# Patient Record
Sex: FEMALE | Race: WHITE | NOT HISPANIC OR LATINO
[De-identification: names, ages, dates, MRNs, and addresses within clinical notes are randomized per-mention and may not be internally consistent; named-entity substitution may affect disease eponyms.]

---

## 2017-08-10 ENCOUNTER — RESULT REVIEW (OUTPATIENT)
Age: 32
End: 2017-08-10

## 2018-07-25 ENCOUNTER — RX RENEWAL (OUTPATIENT)
Age: 33
End: 2018-07-25

## 2018-08-16 ENCOUNTER — APPOINTMENT (OUTPATIENT)
Dept: PULMONOLOGY | Facility: CLINIC | Age: 33
End: 2018-08-16
Payer: COMMERCIAL

## 2018-08-16 VITALS — OXYGEN SATURATION: 98 % | HEART RATE: 77 BPM | DIASTOLIC BLOOD PRESSURE: 70 MMHG | SYSTOLIC BLOOD PRESSURE: 103 MMHG

## 2018-08-16 DIAGNOSIS — Z78.9 OTHER SPECIFIED HEALTH STATUS: ICD-10-CM

## 2018-08-16 PROCEDURE — 88738 HGB QUANT TRANSCUTANEOUS: CPT

## 2018-08-16 PROCEDURE — 99214 OFFICE O/P EST MOD 30 MIN: CPT | Mod: 25

## 2018-08-16 PROCEDURE — 94726 PLETHYSMOGRAPHY LUNG VOLUMES: CPT

## 2018-08-16 PROCEDURE — 94060 EVALUATION OF WHEEZING: CPT

## 2018-08-16 PROCEDURE — 95012 NITRIC OXIDE EXP GAS DETER: CPT

## 2018-08-16 PROCEDURE — 94729 DIFFUSING CAPACITY: CPT

## 2018-09-06 ENCOUNTER — RECORD ABSTRACTING (OUTPATIENT)
Age: 33
End: 2018-09-06

## 2019-02-28 ENCOUNTER — APPOINTMENT (OUTPATIENT)
Dept: PULMONOLOGY | Facility: CLINIC | Age: 34
End: 2019-02-28

## 2019-03-11 ENCOUNTER — RESULT REVIEW (OUTPATIENT)
Age: 34
End: 2019-03-11

## 2019-08-02 ENCOUNTER — ASOB RESULT (OUTPATIENT)
Age: 34
End: 2019-08-02

## 2019-08-02 ENCOUNTER — APPOINTMENT (OUTPATIENT)
Dept: ANTEPARTUM | Facility: CLINIC | Age: 34
End: 2019-08-02
Payer: COMMERCIAL

## 2019-08-02 PROCEDURE — 76817 TRANSVAGINAL US OBSTETRIC: CPT

## 2019-08-02 PROCEDURE — 76811 OB US DETAILED SNGL FETUS: CPT

## 2019-08-20 ENCOUNTER — RX RENEWAL (OUTPATIENT)
Age: 34
End: 2019-08-20

## 2019-10-31 ENCOUNTER — APPOINTMENT (OUTPATIENT)
Dept: PULMONOLOGY | Facility: CLINIC | Age: 34
End: 2019-10-31

## 2019-11-13 ENCOUNTER — RX RENEWAL (OUTPATIENT)
Age: 34
End: 2019-11-13

## 2019-11-27 ENCOUNTER — TRANSCRIPTION ENCOUNTER (OUTPATIENT)
Age: 34
End: 2019-11-27

## 2019-11-27 ENCOUNTER — APPOINTMENT (OUTPATIENT)
Dept: ULTRASOUND IMAGING | Facility: CLINIC | Age: 34
End: 2019-11-27
Payer: COMMERCIAL

## 2019-11-27 ENCOUNTER — OUTPATIENT (OUTPATIENT)
Dept: OUTPATIENT SERVICES | Facility: HOSPITAL | Age: 34
LOS: 1 days | End: 2019-11-27
Payer: COMMERCIAL

## 2019-11-27 DIAGNOSIS — Z00.8 ENCOUNTER FOR OTHER GENERAL EXAMINATION: ICD-10-CM

## 2019-11-27 PROCEDURE — 76705 ECHO EXAM OF ABDOMEN: CPT

## 2019-11-27 PROCEDURE — 76705 ECHO EXAM OF ABDOMEN: CPT | Mod: 26

## 2019-11-29 ENCOUNTER — OUTPATIENT (OUTPATIENT)
Dept: OUTPATIENT SERVICES | Facility: HOSPITAL | Age: 34
LOS: 1 days | End: 2019-11-29
Payer: COMMERCIAL

## 2019-11-29 DIAGNOSIS — Z01.818 ENCOUNTER FOR OTHER PREPROCEDURAL EXAMINATION: ICD-10-CM

## 2019-11-29 LAB
ANION GAP SERPL CALC-SCNC: 14 MMOL/L — SIGNIFICANT CHANGE UP (ref 5–17)
BLD GP AB SCN SERPL QL: NEGATIVE — SIGNIFICANT CHANGE UP
BUN SERPL-MCNC: 8 MG/DL — SIGNIFICANT CHANGE UP (ref 7–23)
CALCIUM SERPL-MCNC: 9.2 MG/DL — SIGNIFICANT CHANGE UP (ref 8.4–10.5)
CHLORIDE SERPL-SCNC: 103 MMOL/L — SIGNIFICANT CHANGE UP (ref 96–108)
CO2 SERPL-SCNC: 19 MMOL/L — LOW (ref 22–31)
CREAT SERPL-MCNC: 0.7 MG/DL — SIGNIFICANT CHANGE UP (ref 0.5–1.3)
GLUCOSE SERPL-MCNC: 80 MG/DL — SIGNIFICANT CHANGE UP (ref 70–99)
HCT VFR BLD CALC: 36.5 % — SIGNIFICANT CHANGE UP (ref 34.5–45)
HGB BLD-MCNC: 11.8 G/DL — SIGNIFICANT CHANGE UP (ref 11.5–15.5)
MCHC RBC-ENTMCNC: 28.2 PG — SIGNIFICANT CHANGE UP (ref 27–34)
MCHC RBC-ENTMCNC: 32.3 GM/DL — SIGNIFICANT CHANGE UP (ref 32–36)
MCV RBC AUTO: 87.1 FL — SIGNIFICANT CHANGE UP (ref 80–100)
PLATELET # BLD AUTO: 208 K/UL — SIGNIFICANT CHANGE UP (ref 150–400)
POTASSIUM SERPL-MCNC: 3.9 MMOL/L — SIGNIFICANT CHANGE UP (ref 3.5–5.3)
POTASSIUM SERPL-SCNC: 3.9 MMOL/L — SIGNIFICANT CHANGE UP (ref 3.5–5.3)
RBC # BLD: 4.19 M/UL — SIGNIFICANT CHANGE UP (ref 3.8–5.2)
RBC # FLD: 14.8 % — HIGH (ref 10.3–14.5)
RH IG SCN BLD-IMP: POSITIVE — SIGNIFICANT CHANGE UP
SODIUM SERPL-SCNC: 136 MMOL/L — SIGNIFICANT CHANGE UP (ref 135–145)
WBC # BLD: 10.63 K/UL — HIGH (ref 3.8–10.5)
WBC # FLD AUTO: 10.63 K/UL — HIGH (ref 3.8–10.5)

## 2019-11-29 PROCEDURE — 86900 BLOOD TYPING SEROLOGIC ABO: CPT

## 2019-11-29 PROCEDURE — G0463: CPT

## 2019-11-29 PROCEDURE — 85027 COMPLETE CBC AUTOMATED: CPT

## 2019-11-29 PROCEDURE — 86901 BLOOD TYPING SEROLOGIC RH(D): CPT

## 2019-11-29 PROCEDURE — 86850 RBC ANTIBODY SCREEN: CPT

## 2019-11-29 PROCEDURE — 80048 BASIC METABOLIC PNL TOTAL CA: CPT

## 2019-11-29 RX ORDER — OXYTOCIN 10 UNIT/ML
333.33 VIAL (ML) INJECTION
Qty: 20 | Refills: 0 | Status: DISCONTINUED | OUTPATIENT
Start: 2019-12-12 | End: 2019-12-13

## 2019-11-29 RX ORDER — METOCLOPRAMIDE HCL 10 MG
10 TABLET ORAL ONCE
Refills: 0 | Status: DISCONTINUED | OUTPATIENT
Start: 2019-12-12 | End: 2019-12-12

## 2019-11-29 RX ORDER — SODIUM CHLORIDE 9 MG/ML
1000 INJECTION, SOLUTION INTRAVENOUS
Refills: 0 | Status: DISCONTINUED | OUTPATIENT
Start: 2019-12-12 | End: 2019-12-12

## 2019-12-11 ENCOUNTER — TRANSCRIPTION ENCOUNTER (OUTPATIENT)
Age: 34
End: 2019-12-11

## 2019-12-12 ENCOUNTER — INPATIENT (INPATIENT)
Facility: HOSPITAL | Age: 34
LOS: 2 days | Discharge: HOME | End: 2019-12-15
Attending: OBSTETRICS & GYNECOLOGY | Admitting: OBSTETRICS & GYNECOLOGY
Payer: COMMERCIAL

## 2019-12-12 VITALS — WEIGHT: 189.6 LBS

## 2019-12-12 LAB
BLD GP AB SCN SERPL QL: NEGATIVE — SIGNIFICANT CHANGE UP
RH IG SCN BLD-IMP: POSITIVE — SIGNIFICANT CHANGE UP
T PALLIDUM AB TITR SER: NEGATIVE — SIGNIFICANT CHANGE UP

## 2019-12-12 RX ORDER — LANOLIN
1 OINTMENT (GRAM) TOPICAL EVERY 6 HOURS
Refills: 0 | Status: DISCONTINUED | OUTPATIENT
Start: 2019-12-12 | End: 2019-12-15

## 2019-12-12 RX ORDER — GLYCERIN ADULT
1 SUPPOSITORY, RECTAL RECTAL AT BEDTIME
Refills: 0 | Status: DISCONTINUED | OUTPATIENT
Start: 2019-12-12 | End: 2019-12-15

## 2019-12-12 RX ORDER — HEPARIN SODIUM 5000 [USP'U]/ML
5000 INJECTION INTRAVENOUS; SUBCUTANEOUS EVERY 12 HOURS
Refills: 0 | Status: DISCONTINUED | OUTPATIENT
Start: 2019-12-12 | End: 2019-12-15

## 2019-12-12 RX ORDER — ALBUTEROL 90 UG/1
2 AEROSOL, METERED ORAL EVERY 6 HOURS
Refills: 0 | Status: DISCONTINUED | OUTPATIENT
Start: 2019-12-12 | End: 2019-12-15

## 2019-12-12 RX ORDER — ACETAMINOPHEN 500 MG
975 TABLET ORAL
Refills: 0 | Status: DISCONTINUED | OUTPATIENT
Start: 2019-12-12 | End: 2019-12-15

## 2019-12-12 RX ORDER — TETANUS TOXOID, REDUCED DIPHTHERIA TOXOID AND ACELLULAR PERTUSSIS VACCINE, ADSORBED 5; 2.5; 8; 8; 2.5 [IU]/.5ML; [IU]/.5ML; UG/.5ML; UG/.5ML; UG/.5ML
0.5 SUSPENSION INTRAMUSCULAR ONCE
Refills: 0 | Status: DISCONTINUED | OUTPATIENT
Start: 2019-12-12 | End: 2019-12-15

## 2019-12-12 RX ORDER — MAGNESIUM HYDROXIDE 400 MG/1
30 TABLET, CHEWABLE ORAL
Refills: 0 | Status: DISCONTINUED | OUTPATIENT
Start: 2019-12-12 | End: 2019-12-15

## 2019-12-12 RX ORDER — SODIUM CHLORIDE 9 MG/ML
1000 INJECTION, SOLUTION INTRAVENOUS
Refills: 0 | Status: DISCONTINUED | OUTPATIENT
Start: 2019-12-12 | End: 2019-12-15

## 2019-12-12 RX ORDER — NALOXONE HYDROCHLORIDE 4 MG/.1ML
0.1 SPRAY NASAL
Refills: 0 | Status: DISCONTINUED | OUTPATIENT
Start: 2019-12-12 | End: 2019-12-13

## 2019-12-12 RX ORDER — OXYCODONE HYDROCHLORIDE 5 MG/1
5 TABLET ORAL
Refills: 0 | Status: COMPLETED | OUTPATIENT
Start: 2019-12-12 | End: 2019-12-19

## 2019-12-12 RX ORDER — SODIUM CHLORIDE 9 MG/ML
1000 INJECTION, SOLUTION INTRAVENOUS ONCE
Refills: 0 | Status: COMPLETED | OUTPATIENT
Start: 2019-12-12 | End: 2019-12-12

## 2019-12-12 RX ORDER — IBUPROFEN 200 MG
600 TABLET ORAL EVERY 6 HOURS
Refills: 0 | Status: COMPLETED | OUTPATIENT
Start: 2019-12-12 | End: 2020-11-09

## 2019-12-12 RX ORDER — CHLORHEXIDINE GLUCONATE 213 G/1000ML
1 SOLUTION TOPICAL ONCE
Refills: 0 | Status: COMPLETED | OUTPATIENT
Start: 2019-12-12 | End: 2019-12-12

## 2019-12-12 RX ORDER — CEFAZOLIN SODIUM 1 G
2000 VIAL (EA) INJECTION ONCE
Refills: 0 | Status: COMPLETED | OUTPATIENT
Start: 2019-12-12 | End: 2019-12-12

## 2019-12-12 RX ORDER — FENTANYL/BUPIVACAINE/NS/PF 2MCG/ML-.1
5 PLASTIC BAG, INJECTION (ML) INJECTION
Refills: 0 | Status: DISCONTINUED | OUTPATIENT
Start: 2019-12-12 | End: 2019-12-13

## 2019-12-12 RX ORDER — CITRIC ACID/SODIUM CITRATE 300-500 MG
15 SOLUTION, ORAL ORAL ONCE
Refills: 0 | Status: COMPLETED | OUTPATIENT
Start: 2019-12-12 | End: 2019-12-12

## 2019-12-12 RX ORDER — ONDANSETRON 8 MG/1
4 TABLET, FILM COATED ORAL EVERY 6 HOURS
Refills: 0 | Status: DISCONTINUED | OUTPATIENT
Start: 2019-12-12 | End: 2019-12-13

## 2019-12-12 RX ORDER — OXYCODONE HYDROCHLORIDE 5 MG/1
5 TABLET ORAL ONCE
Refills: 0 | Status: DISCONTINUED | OUTPATIENT
Start: 2019-12-12 | End: 2019-12-15

## 2019-12-12 RX ORDER — DEXAMETHASONE 0.5 MG/5ML
4 ELIXIR ORAL EVERY 6 HOURS
Refills: 0 | Status: DISCONTINUED | OUTPATIENT
Start: 2019-12-12 | End: 2019-12-13

## 2019-12-12 RX ORDER — DIPHENHYDRAMINE HCL 50 MG
25 CAPSULE ORAL EVERY 6 HOURS
Refills: 0 | Status: DISCONTINUED | OUTPATIENT
Start: 2019-12-12 | End: 2019-12-15

## 2019-12-12 RX ORDER — OXYTOCIN 10 UNIT/ML
333.33 VIAL (ML) INJECTION
Qty: 20 | Refills: 0 | Status: DISCONTINUED | OUTPATIENT
Start: 2019-12-12 | End: 2019-12-15

## 2019-12-12 RX ORDER — BUDESONIDE AND FORMOTEROL FUMARATE DIHYDRATE 160; 4.5 UG/1; UG/1
2 AEROSOL RESPIRATORY (INHALATION) DAILY
Refills: 0 | Status: DISCONTINUED | OUTPATIENT
Start: 2019-12-12 | End: 2019-12-12

## 2019-12-12 RX ORDER — SIMETHICONE 80 MG/1
80 TABLET, CHEWABLE ORAL EVERY 4 HOURS
Refills: 0 | Status: DISCONTINUED | OUTPATIENT
Start: 2019-12-12 | End: 2019-12-15

## 2019-12-12 RX ORDER — FENTANYL/BUPIVACAINE/NS/PF 2MCG/ML-.1
250 PLASTIC BAG, INJECTION (ML) INJECTION
Refills: 0 | Status: DISCONTINUED | OUTPATIENT
Start: 2019-12-12 | End: 2019-12-13

## 2019-12-12 RX ORDER — FAMOTIDINE 10 MG/ML
20 INJECTION INTRAVENOUS ONCE
Refills: 0 | Status: COMPLETED | OUTPATIENT
Start: 2019-12-12 | End: 2019-12-12

## 2019-12-12 RX ADMIN — FAMOTIDINE 20 MILLIGRAM(S): 10 INJECTION INTRAVENOUS at 06:52

## 2019-12-12 RX ADMIN — HEPARIN SODIUM 5000 UNIT(S): 5000 INJECTION INTRAVENOUS; SUBCUTANEOUS at 18:19

## 2019-12-12 RX ADMIN — Medication 100 MILLIGRAM(S): at 08:00

## 2019-12-12 RX ADMIN — Medication 250 MILLILITER(S): at 13:24

## 2019-12-12 RX ADMIN — Medication 250 MILLILITER(S): at 10:19

## 2019-12-12 RX ADMIN — Medication 975 MILLIGRAM(S): at 19:00

## 2019-12-12 RX ADMIN — Medication 975 MILLIGRAM(S): at 18:19

## 2019-12-12 RX ADMIN — SODIUM CHLORIDE 125 MILLILITER(S): 9 INJECTION, SOLUTION INTRAVENOUS at 09:28

## 2019-12-12 RX ADMIN — Medication 1000 MILLIUNIT(S)/MIN: at 09:27

## 2019-12-12 RX ADMIN — Medication 15 MILLILITER(S): at 06:51

## 2019-12-12 RX ADMIN — CHLORHEXIDINE GLUCONATE 1 APPLICATION(S): 213 SOLUTION TOPICAL at 06:51

## 2019-12-12 RX ADMIN — SODIUM CHLORIDE 2000 MILLILITER(S): 9 INJECTION, SOLUTION INTRAVENOUS at 06:51

## 2019-12-12 NOTE — PRE-ANESTHESIA EVALUATION ADULT - NSANTHPMHFT_GEN_ALL_CORE
37WGA with no issues, previous  due to macrosomia. PMHx significant for Asthma on Advair and PRN Albuterol (No hospitalizations or intubations).  37WGA, previous  due to macrosomia. PMHx significant for Asthma on Advair and PRN Albuterol (No hospitalizations or intubations).

## 2019-12-13 LAB
BASOPHILS # BLD AUTO: 0.05 K/UL — SIGNIFICANT CHANGE UP (ref 0–0.2)
BASOPHILS NFR BLD AUTO: 0.4 % — SIGNIFICANT CHANGE UP (ref 0–2)
EOSINOPHIL # BLD AUTO: 0.15 K/UL — SIGNIFICANT CHANGE UP (ref 0–0.5)
EOSINOPHIL NFR BLD AUTO: 1.2 % — SIGNIFICANT CHANGE UP (ref 0–6)
HCT VFR BLD CALC: 33.4 % — LOW (ref 34.5–45)
HGB BLD-MCNC: 10.6 G/DL — LOW (ref 11.5–15.5)
IMM GRANULOCYTES NFR BLD AUTO: 1.1 % — SIGNIFICANT CHANGE UP (ref 0–1.5)
LYMPHOCYTES # BLD AUTO: 2.59 K/UL — SIGNIFICANT CHANGE UP (ref 1–3.3)
LYMPHOCYTES # BLD AUTO: 21.2 % — SIGNIFICANT CHANGE UP (ref 13–44)
MCHC RBC-ENTMCNC: 27.4 PG — SIGNIFICANT CHANGE UP (ref 27–34)
MCHC RBC-ENTMCNC: 31.7 GM/DL — LOW (ref 32–36)
MCV RBC AUTO: 86.3 FL — SIGNIFICANT CHANGE UP (ref 80–100)
MONOCYTES # BLD AUTO: 0.92 K/UL — HIGH (ref 0–0.9)
MONOCYTES NFR BLD AUTO: 7.5 % — SIGNIFICANT CHANGE UP (ref 2–14)
NEUTROPHILS # BLD AUTO: 8.34 K/UL — HIGH (ref 1.8–7.4)
NEUTROPHILS NFR BLD AUTO: 68.6 % — SIGNIFICANT CHANGE UP (ref 43–77)
NRBC # BLD: 0 /100 WBCS — SIGNIFICANT CHANGE UP (ref 0–0)
PLATELET # BLD AUTO: 191 K/UL — SIGNIFICANT CHANGE UP (ref 150–400)
RBC # BLD: 3.87 M/UL — SIGNIFICANT CHANGE UP (ref 3.8–5.2)
RBC # FLD: 14.6 % — HIGH (ref 10.3–14.5)
WBC # BLD: 12.19 K/UL — HIGH (ref 3.8–10.5)
WBC # FLD AUTO: 12.19 K/UL — HIGH (ref 3.8–10.5)

## 2019-12-13 RX ORDER — OXYCODONE HYDROCHLORIDE 5 MG/1
5 TABLET ORAL ONCE
Refills: 0 | Status: DISCONTINUED | OUTPATIENT
Start: 2019-12-13 | End: 2019-12-15

## 2019-12-13 RX ORDER — IBUPROFEN 200 MG
600 TABLET ORAL EVERY 6 HOURS
Refills: 0 | Status: DISCONTINUED | OUTPATIENT
Start: 2019-12-13 | End: 2019-12-15

## 2019-12-13 RX ORDER — OXYCODONE HYDROCHLORIDE 5 MG/1
5 TABLET ORAL
Refills: 0 | Status: DISCONTINUED | OUTPATIENT
Start: 2019-12-13 | End: 2019-12-15

## 2019-12-13 RX ADMIN — Medication 1 TABLET(S): at 12:39

## 2019-12-13 RX ADMIN — Medication 975 MILLIGRAM(S): at 12:38

## 2019-12-13 RX ADMIN — Medication 600 MILLIGRAM(S): at 15:18

## 2019-12-13 RX ADMIN — Medication 600 MILLIGRAM(S): at 23:13

## 2019-12-13 RX ADMIN — Medication 600 MILLIGRAM(S): at 23:50

## 2019-12-13 RX ADMIN — Medication 975 MILLIGRAM(S): at 18:23

## 2019-12-13 RX ADMIN — Medication 600 MILLIGRAM(S): at 15:48

## 2019-12-13 RX ADMIN — Medication 975 MILLIGRAM(S): at 18:53

## 2019-12-13 RX ADMIN — OXYCODONE HYDROCHLORIDE 5 MILLIGRAM(S): 5 TABLET ORAL at 18:22

## 2019-12-13 RX ADMIN — OXYCODONE HYDROCHLORIDE 5 MILLIGRAM(S): 5 TABLET ORAL at 13:12

## 2019-12-13 RX ADMIN — HEPARIN SODIUM 5000 UNIT(S): 5000 INJECTION INTRAVENOUS; SUBCUTANEOUS at 18:24

## 2019-12-13 RX ADMIN — Medication 600 MILLIGRAM(S): at 10:01

## 2019-12-13 RX ADMIN — Medication 600 MILLIGRAM(S): at 09:32

## 2019-12-13 RX ADMIN — OXYCODONE HYDROCHLORIDE 5 MILLIGRAM(S): 5 TABLET ORAL at 09:31

## 2019-12-13 RX ADMIN — OXYCODONE HYDROCHLORIDE 5 MILLIGRAM(S): 5 TABLET ORAL at 12:42

## 2019-12-13 RX ADMIN — Medication 975 MILLIGRAM(S): at 06:06

## 2019-12-13 RX ADMIN — Medication 975 MILLIGRAM(S): at 13:08

## 2019-12-13 RX ADMIN — Medication 975 MILLIGRAM(S): at 06:45

## 2019-12-13 RX ADMIN — OXYCODONE HYDROCHLORIDE 5 MILLIGRAM(S): 5 TABLET ORAL at 10:01

## 2019-12-13 RX ADMIN — HEPARIN SODIUM 5000 UNIT(S): 5000 INJECTION INTRAVENOUS; SUBCUTANEOUS at 06:06

## 2019-12-13 RX ADMIN — OXYCODONE HYDROCHLORIDE 5 MILLIGRAM(S): 5 TABLET ORAL at 18:52

## 2019-12-13 NOTE — PROGRESS NOTE ADULT - SUBJECTIVE AND OBJECTIVE BOX
Postpartum Note-  Section POD#1    Allergies    dust (Eye Irritation; Rhinorrhea)  No Known Drug Allergies    Blood Type B Positive     RPR Negative    Rubella: Immune    S: Patient is a  34y G        POD#1 S/P  repeat C/Sec  Patient w/o complaints, pain is controlled.  Pt is OOB, tolerating PO, passing flatus. Lochia WNL    Feeding: Breast    O:  Vital Signs Last 24 Hrs  T(C): 36.4 (13 Dec 2019 06:43), Max: 36.7 (12 Dec 2019 21:35)  T(F): 97.5 (13 Dec 2019 06:43), Max: 98.1 (12 Dec 2019 21:35)  HR: 101 (13 Dec 2019 06:43) (66 - 101)  BP: 109/72 (13 Dec 2019 06:43) (99/64 - 120/84)  BP(mean): 88 (12 Dec 2019 12:00) (79 - 98)  RR: 18 (13 Dec 2019 06:43) (18 - 20)  SpO2: 98% (12 Dec 2019 21:35) (96% - 98%)  I&O's Summary    12 Dec 2019 07:01  -  13 Dec 2019 07:00  --------------------------------------------------------  IN: 2000 mL / OUT: 2650 mL / NET: -650 mL        Gen: NAD  Abdomen: +BS, Soft, nontender, non-distended, fundus firm.  Incision: Clean, dry, and intact.  Negative erythema/edema/ecchymosis   Sub Q/Steri  Lochia WNL  Ext: SCDs in place.  Negative Homans B/L    LABS:                          10.6   12.19 )-----------( 191      ( 13 Dec 2019 06:55 )             33.4

## 2019-12-13 NOTE — PROGRESS NOTE ADULT - SUBJECTIVE AND OBJECTIVE BOX
Day 1 of Anesthesia Pain Management Service    SUBJECTIVE: I'm okay  Pain Scale Score:    [X] Refer to charted pain scores    THERAPY: Epidural Bupivacaine 0.01 % and Fentanyl 3 micrograms/mL     Demand Dose: 3 mL  Lockout: 15 minutes   Continuous Rate:  10 mL    MEDICATIONS  (STANDING):  acetaminophen   Tablet .. 975 milliGRAM(s) Oral <User Schedule>  diphtheria/tetanus/pertussis (acellular) Vaccine (ADAcel) 0.5 milliLiter(s) IntraMuscular once  fentanyl (3 MICROgram(s)/mL) + bupivacaine 0.01% in 0.9% Sodium Chloride PCEA 250 milliLiter(s) Epidural PCA Continuous  heparin  Injectable 5000 Unit(s) SubCutaneous every 12 hours  ibuprofen  Tablet. 600 milliGRAM(s) Oral every 6 hours  lactated ringers. 1000 milliLiter(s) (125 mL/Hr) IV Continuous <Continuous>  oxytocin Infusion 333.333 milliUNIT(s)/Min (1000 mL/Hr) IV Continuous <Continuous>  prenatal multivitamin 1 Tablet(s) Oral daily    MEDICATIONS  (PRN):  ALBUTerol    90 MICROgram(s) HFA Inhaler 2 Puff(s) Inhalation every 6 hours PRN Shortness of Breath and/or Wheezing  dexAMETHasone  Injectable 4 milliGRAM(s) IV Push every 6 hours PRN Nausea  diphenhydrAMINE 25 milliGRAM(s) Oral every 6 hours PRN Itching  fentanyl (3 MICROgram(s)/mL) + bupivacaine 0.01% in 0.9% Sodium Chloride PCEA Rescue Clinician Bolus 5 milliLiter(s) Epidural every 15 minutes PRN Moderate Pain (4 - 6)  glycerin Suppository - Adult 1 Suppository(s) Rectal at bedtime PRN Constipation  lanolin Ointment 1 Application(s) Topical every 6 hours PRN Sore Nipples  magnesium hydroxide Suspension 30 milliLiter(s) Oral two times a day PRN Constipation  naloxone Injectable 0.1 milliGRAM(s) IV Push every 3 minutes PRN For ANY of the following changes in patient status:  A. RR LESS THAN 10 breaths per minute, B. Oxygen saturation LESS THAN 90%, C. Sedation score of 6  ondansetron Injectable 4 milliGRAM(s) IV Push every 6 hours PRN Nausea  oxyCODONE    IR 5 milliGRAM(s) Oral once PRN Moderate to Severe Pain (4-10)  oxyCODONE    IR 5 milliGRAM(s) Oral every 3 hours PRN Moderate to Severe Pain (4-10)  oxyCODONE    IR 5 milliGRAM(s) Oral once PRN Moderate to Severe Pain (4-10)  simethicone 80 milliGRAM(s) Chew every 4 hours PRN Gas      OBJECTIVE:    Assessment of Epidural Catheter Site: 	    [ ] Dressing intact	[X] Site non-tender	[X] Site without erythema, discharge, edema  [ ] Epidural tubing and connection checked	[X] Gross neurological exam within normal limits  [X] Catheter removed                          10.6   12.19 )-----------( 191      ( 13 Dec 2019 06:55 )             33.4     Vital Signs Last 24 Hrs  T(C): 36.8 (12-13-19 @ 08:59), Max: 36.8 (12-13-19 @ 08:59)  T(F): 98.2 (12-13-19 @ 08:59), Max: 98.2 (12-13-19 @ 08:59)  HR: 83 (12-13-19 @ 08:59) (66 - 101)  BP: 104/69 (12-13-19 @ 08:59) (99/64 - 118/72)  BP(mean): 88 (12-12-19 @ 12:00) (81 - 94)  RR: 18 (12-13-19 @ 08:59) (18 - 20)  SpO2: 97% (12-13-19 @ 08:59) (96% - 98%)      Sedation Score:	[X] Alert	[ ] Drowsy	[ ] Arousable  [ ] Asleep  [ ] Unresponsive    Side Effects:	[X] None	[ ] Nausea	[ ] Vomiting  [ ] Pruritus  		[ ] Weakness  [ ] Numbness  [ ] Other:    ASSESSMENT/ PLAN:    Therapy:                         [ ] Continue   [X] Discontinue   [X] Change to PRN Analgesics    Documentation and Verification of current medications:  [X] Done	[ ] Not done, not eligible, reason:    Comments: Anticoagulation status reviewed.

## 2019-12-13 NOTE — PROGRESS NOTE ADULT - ASSESSMENT
A/P:  34y    P    S/P  repeat   section   POD # 1, doing well        PMHx: none  Current Issues: none

## 2019-12-13 NOTE — PROGRESS NOTE ADULT - SUBJECTIVE AND OBJECTIVE BOX
Postpartum Note,  Section   ATTENDING NOTE - Post-operative day 1    Subjective:  The patient feels well. Ambulating without difficulty  Pt is tolerating regular diet. Pain well controlled.  She denies nausea and vomiting.  She reports normal postpartum bleeding    Physical exam:    Vital Signs Last 24 Hrs  T(C): 36.8 (13 Dec 2019 08:59), Max: 36.8 (13 Dec 2019 08:59)  T(F): 98.2 (13 Dec 2019 08:59), Max: 98.2 (13 Dec 2019 08:59)  HR: 83 (13 Dec 2019 08:59) (66 - 101)  BP: 104/69 (13 Dec 2019 08:59) (99/64 - 115/76)  BP(mean): 88 (12 Dec 2019 12:00) (88 - 92)  RR: 18 (13 Dec 2019 08:59) (18 - 19)  SpO2: 97% (13 Dec 2019 08:59) (96% - 98%)    Gen: NAD  Breast: Soft, nontender, not engorged.  Abdomen: Soft, nontender, no distension , firm uterine fundus at umbilicus -2.  Incision: Clean, dry, and intact with steris  Ext: No calf tenderness, no hyper reflexia, (  )trace (  )1+  edema      LABS:                        10.6   12.19 )-----------( 191      ( 13 Dec 2019 06:55 )             33.4                   Allergies    dust (Eye Irritation; Rhinorrhea)  No Known Drug Allergies    Intolerances      MEDICATIONS  (STANDING):  acetaminophen   Tablet .. 975 milliGRAM(s) Oral <User Schedule>  diphtheria/tetanus/pertussis (acellular) Vaccine (ADAcel) 0.5 milliLiter(s) IntraMuscular once  heparin  Injectable 5000 Unit(s) SubCutaneous every 12 hours  ibuprofen  Tablet. 600 milliGRAM(s) Oral every 6 hours  lactated ringers. 1000 milliLiter(s) (125 mL/Hr) IV Continuous <Continuous>  oxytocin Infusion 333.333 milliUNIT(s)/Min (1000 mL/Hr) IV Continuous <Continuous>  prenatal multivitamin 1 Tablet(s) Oral daily    MEDICATIONS  (PRN):  ALBUTerol    90 MICROgram(s) HFA Inhaler 2 Puff(s) Inhalation every 6 hours PRN Shortness of Breath and/or Wheezing  diphenhydrAMINE 25 milliGRAM(s) Oral every 6 hours PRN Itching  glycerin Suppository - Adult 1 Suppository(s) Rectal at bedtime PRN Constipation  lanolin Ointment 1 Application(s) Topical every 6 hours PRN Sore Nipples  magnesium hydroxide Suspension 30 milliLiter(s) Oral two times a day PRN Constipation  oxyCODONE    IR 5 milliGRAM(s) Oral once PRN Moderate to Severe Pain (4-10)  oxyCODONE    IR 5 milliGRAM(s) Oral every 3 hours PRN Moderate to Severe Pain (4-10)  oxyCODONE    IR 5 milliGRAM(s) Oral once PRN Moderate to Severe Pain (4-10)  simethicone 80 milliGRAM(s) Chew every 4 hours PRN Gas        Assessment and Plan    Office [phone]  Dr. Ren

## 2019-12-14 RX ADMIN — Medication 600 MILLIGRAM(S): at 21:14

## 2019-12-14 RX ADMIN — Medication 1 TABLET(S): at 13:01

## 2019-12-14 RX ADMIN — Medication 600 MILLIGRAM(S): at 21:45

## 2019-12-14 RX ADMIN — Medication 975 MILLIGRAM(S): at 17:08

## 2019-12-14 RX ADMIN — HEPARIN SODIUM 5000 UNIT(S): 5000 INJECTION INTRAVENOUS; SUBCUTANEOUS at 17:08

## 2019-12-14 RX ADMIN — Medication 975 MILLIGRAM(S): at 06:08

## 2019-12-14 RX ADMIN — Medication 975 MILLIGRAM(S): at 23:05

## 2019-12-14 RX ADMIN — Medication 975 MILLIGRAM(S): at 22:35

## 2019-12-14 RX ADMIN — Medication 600 MILLIGRAM(S): at 13:30

## 2019-12-14 RX ADMIN — Medication 600 MILLIGRAM(S): at 13:01

## 2019-12-14 RX ADMIN — HEPARIN SODIUM 5000 UNIT(S): 5000 INJECTION INTRAVENOUS; SUBCUTANEOUS at 06:09

## 2019-12-14 RX ADMIN — Medication 975 MILLIGRAM(S): at 17:38

## 2019-12-14 NOTE — PROGRESS NOTE ADULT - SUBJECTIVE AND OBJECTIVE BOX
Postpartum Note-  Section POD#2    Allergies    dust (Eye Irritation; Rhinorrhea)  No Known Drug Allergies    Subjective: Patient w/o complaints, pain is controlled.  Pt is OOB, tolerating PO, passing flatus, and voiding. Lochia WNL.     O:  Vital Signs Last 24 Hrs  T(C): 36.7 (14 Dec 2019 05:06), Max: 36.8 (13 Dec 2019 08:59)  T(F): 98.1 (14 Dec 2019 05:06), Max: 98.2 (13 Dec 2019 08:59)  HR: 70 (14 Dec 2019 05:15) (62 - 83)  BP: 99/61 (14 Dec 2019 05:15) (92/62 - 108/71)  BP(mean): --  RR: 18 (14 Dec 2019 05:06) (18 - 18)  SpO2: 97% (14 Dec 2019 05:06) (96% - 99%)      Gen: NAD  S1S2 RRR  Lungs CTA b/l  Abdomen: Soft, nontender, non distended, fundus firm.  Incision: Clean, dry, and intact.  Negative erythema/edema/ecchymosis.   SubQ  Lochia WNL  Ext: Neg edema, Neg calf tenderness    LABS:               10.6   12.19 )-----------( 191      ( 12-13 @ 06:55 )             33.4

## 2019-12-15 ENCOUNTER — TRANSCRIPTION ENCOUNTER (OUTPATIENT)
Age: 34
End: 2019-12-15

## 2019-12-15 VITALS
SYSTOLIC BLOOD PRESSURE: 104 MMHG | TEMPERATURE: 98 F | DIASTOLIC BLOOD PRESSURE: 73 MMHG | RESPIRATION RATE: 18 BRPM | HEART RATE: 67 BPM

## 2019-12-15 LAB
BASOPHILS # BLD AUTO: 0.06 K/UL — SIGNIFICANT CHANGE UP (ref 0–0.2)
BASOPHILS NFR BLD AUTO: 0.7 % — SIGNIFICANT CHANGE UP (ref 0–2)
EOSINOPHIL # BLD AUTO: 0.22 K/UL — SIGNIFICANT CHANGE UP (ref 0–0.5)
EOSINOPHIL NFR BLD AUTO: 2.6 % — SIGNIFICANT CHANGE UP (ref 0–6)
HCT VFR BLD CALC: 30.6 % — LOW (ref 34.5–45)
HGB BLD-MCNC: 9.5 G/DL — LOW (ref 11.5–15.5)
IMM GRANULOCYTES NFR BLD AUTO: 1.1 % — SIGNIFICANT CHANGE UP (ref 0–1.5)
LYMPHOCYTES # BLD AUTO: 1.93 K/UL — SIGNIFICANT CHANGE UP (ref 1–3.3)
LYMPHOCYTES # BLD AUTO: 22.5 % — SIGNIFICANT CHANGE UP (ref 13–44)
MCHC RBC-ENTMCNC: 27.1 PG — SIGNIFICANT CHANGE UP (ref 27–34)
MCHC RBC-ENTMCNC: 31 GM/DL — LOW (ref 32–36)
MCV RBC AUTO: 87.4 FL — SIGNIFICANT CHANGE UP (ref 80–100)
MONOCYTES # BLD AUTO: 0.67 K/UL — SIGNIFICANT CHANGE UP (ref 0–0.9)
MONOCYTES NFR BLD AUTO: 7.8 % — SIGNIFICANT CHANGE UP (ref 2–14)
NEUTROPHILS # BLD AUTO: 5.59 K/UL — SIGNIFICANT CHANGE UP (ref 1.8–7.4)
NEUTROPHILS NFR BLD AUTO: 65.3 % — SIGNIFICANT CHANGE UP (ref 43–77)
NRBC # BLD: 0 /100 WBCS — SIGNIFICANT CHANGE UP (ref 0–0)
PLATELET # BLD AUTO: 185 K/UL — SIGNIFICANT CHANGE UP (ref 150–400)
RBC # BLD: 3.5 M/UL — LOW (ref 3.8–5.2)
RBC # FLD: 14.7 % — HIGH (ref 10.3–14.5)
WBC # BLD: 8.56 K/UL — SIGNIFICANT CHANGE UP (ref 3.8–10.5)
WBC # FLD AUTO: 8.56 K/UL — SIGNIFICANT CHANGE UP (ref 3.8–10.5)

## 2019-12-15 PROCEDURE — 59025 FETAL NON-STRESS TEST: CPT

## 2019-12-15 PROCEDURE — 86900 BLOOD TYPING SEROLOGIC ABO: CPT

## 2019-12-15 PROCEDURE — 86780 TREPONEMA PALLIDUM: CPT

## 2019-12-15 PROCEDURE — 85027 COMPLETE CBC AUTOMATED: CPT

## 2019-12-15 PROCEDURE — 59050 FETAL MONITOR W/REPORT: CPT

## 2019-12-15 PROCEDURE — 86850 RBC ANTIBODY SCREEN: CPT

## 2019-12-15 PROCEDURE — C1889: CPT

## 2019-12-15 PROCEDURE — 86901 BLOOD TYPING SEROLOGIC RH(D): CPT

## 2019-12-15 RX ORDER — IBUPROFEN 200 MG
1 TABLET ORAL
Qty: 0 | Refills: 0 | DISCHARGE
Start: 2019-12-15

## 2019-12-15 RX ORDER — ALBUTEROL 90 UG/1
2 AEROSOL, METERED ORAL
Qty: 0 | Refills: 0 | DISCHARGE

## 2019-12-15 RX ORDER — FLUTICASONE PROPIONATE AND SALMETEROL 50; 250 UG/1; UG/1
2 POWDER ORAL; RESPIRATORY (INHALATION)
Qty: 0 | Refills: 0 | DISCHARGE

## 2019-12-15 RX ORDER — CHOLECALCIFEROL (VITAMIN D3) 125 MCG
1 CAPSULE ORAL
Qty: 0 | Refills: 0 | DISCHARGE

## 2019-12-15 RX ORDER — OXYCODONE HYDROCHLORIDE 5 MG/1
1 TABLET ORAL
Qty: 0 | Refills: 0 | DISCHARGE
Start: 2019-12-15

## 2019-12-15 RX ORDER — ACETAMINOPHEN 500 MG
3 TABLET ORAL
Qty: 0 | Refills: 0 | DISCHARGE
Start: 2019-12-15

## 2019-12-15 RX ADMIN — HEPARIN SODIUM 5000 UNIT(S): 5000 INJECTION INTRAVENOUS; SUBCUTANEOUS at 06:17

## 2019-12-15 RX ADMIN — Medication 600 MILLIGRAM(S): at 10:57

## 2019-12-15 RX ADMIN — Medication 975 MILLIGRAM(S): at 06:16

## 2019-12-15 RX ADMIN — Medication 600 MILLIGRAM(S): at 11:26

## 2019-12-15 RX ADMIN — Medication 600 MILLIGRAM(S): at 05:00

## 2019-12-15 RX ADMIN — Medication 600 MILLIGRAM(S): at 04:23

## 2019-12-15 NOTE — PROGRESS NOTE ADULT - ASSESSMENT
A/P:  34y  POD # 3 S/P  repeat   section, doing well    PMHx:  c/s, ewndometriosis, asthma, nephrolithiasis,   Current Issues:

## 2019-12-15 NOTE — PROGRESS NOTE ADULT - PROBLEM SELECTOR PLAN 1
POD # 1  s/p  section. Stable.  Encourage ambulation  Continue with PCEA/PCA  Regular diet as tolerated  Follow up CBC
Increase OOB  D/C IVF  D/C PCEA  PO Pain Protocol  Continue Regular Diet  Continue routine prenatal care
Increase OOB  Regular diet  AM CBC  PO Pain Protocol  Discharge Planning
Increase OOB  Renew IVF  Renew PCEA  DVT ppx  Dressing removed  Regular diet  AM CBC  Routine Postpartum/Post-op care    Yolanda Connelly PA-C

## 2019-12-15 NOTE — DISCHARGE NOTE OB - MEDICATION SUMMARY - MEDICATIONS TO TAKE
I will START or STAY ON the medications listed below when I get home from the hospital:    Actamin 325 mg oral tablet  -- 3 tab(s) by mouth   -- Indication: For pain     ibuprofen 600 mg oral tablet  -- 1 tab(s) by mouth every 6 hours  -- Indication: For pain     oxyCODONE 5 mg oral tablet  -- 1 tab(s) by mouth every 4 to 6 hours, As Needed -10)  -- Indication: For pain    Prenatal Multivitamins with Folic Acid 1 mg oral tablet  -- 1 tab(s) by mouth once a day  -- Indication: For routine postpartum care

## 2019-12-15 NOTE — PROGRESS NOTE ADULT - ATTENDING COMMENTS
POD2 s/p repeat  section, doing well  -Routine postoperative care   -Reg diet   -ONANDO Leal DO
POD3 s/p repeat  section, doing well  -Routine postoperative care  -Discharge home   -Instructions given   -Follow-up in office 2 weeks    Erika Leal DO

## 2019-12-15 NOTE — DISCHARGE NOTE OB - CARE PLAN
Principal Discharge DX:	 delivery delivered  Goal:	routine postop care  Assessment and plan of treatment:	routine post op care

## 2019-12-15 NOTE — PROGRESS NOTE ADULT - SUBJECTIVE AND OBJECTIVE BOX
Postpartum Note-  Section POD#3    Allergies    dust (Eye Irritation; Rhinorrhea)  No Known Drug Allergies    Intolerances            S:Patient is a  34y G [id number]    POD#3 S/P C/Sec  Subjective: Patient w/o complaints, pain is controlled.  Pt is OOB, tolerating PO, passing flatus. Lochia WNL.     O:  Vital Signs Last 24 Hrs  T(C): 36.6 (15 Dec 2019 05:46), Max: 36.8 (14 Dec 2019 13:00)  T(F): 97.9 (15 Dec 2019 05:46), Max: 98.3 (14 Dec 2019 13:00)  HR: 67 (15 Dec 2019 05:46) (67 - 99)  BP: 104/73 (15 Dec 2019 05:46) (99/65 - 120/83)  BP(mean): --  RR: 18 (15 Dec 2019 05:46) (17 - 18)  SpO2: 98% (14 Dec 2019 21:06) (96% - 99%)     Gen: NAD  Abdomen: Soft, nontender, non-distended, fundus firm.  Incision: Clean, dry, and intact.  Negative erythema/edema/ecchymosis   Sub Q  Lochia WNL  Ext:  Neg Edema, Neg Calf tenderness    LABS:                          9.5    8.56  )-----------( 185      ( 15 Dec 2019 06:18 )             30.6

## 2019-12-15 NOTE — DISCHARGE NOTE OB - HOSPITAL COURSE
Pt was admitted for scheduled repeat  section. Postop course uncomplicated. Pt discharged on POD3 in stable condition.

## 2019-12-15 NOTE — DISCHARGE NOTE OB - PATIENT PORTAL LINK FT
You can access the FollowMyHealth Patient Portal offered by [facility] by registering at the following website: [email or web address]. By joining [employer]’s FollowMyHealth portal, you will also be able to view your health information using other applications (apps) compatible with our system.

## 2019-12-15 NOTE — DISCHARGE NOTE OB - CARE PROVIDER_API CALL
Xenia Miranda)  Obstetrics and Gynecology  [address], [address]  [place] [address]  Phone: [phone]  Fax: [phone]  Follow Up Time:

## 2019-12-15 NOTE — DISCHARGE NOTE OB - CARE PROVIDERS DIRECT ADDRESSES
[email or web address] include 234 beds at the [facility]

## 2019-12-16 ENCOUNTER — RX RENEWAL (OUTPATIENT)
Age: 34
End: 2019-12-16

## 2019-12-23 ENCOUNTER — INPATIENT (INPATIENT)
Facility: HOSPITAL | Age: 34
LOS: 0 days | Discharge: HOME | DRG: 776 | End: 2019-12-24
Attending: STUDENT IN AN ORGANIZED HEALTH CARE EDUCATION/TRAINING PROGRAM | Admitting: STUDENT IN AN ORGANIZED HEALTH CARE EDUCATION/TRAINING PROGRAM
Payer: COMMERCIAL

## 2019-12-23 VITALS
DIASTOLIC BLOOD PRESSURE: 89 MMHG | HEIGHT: 67 IN | HEART RATE: 99 BPM | TEMPERATURE: 98 F | SYSTOLIC BLOOD PRESSURE: 121 MMHG | OXYGEN SATURATION: 100 % | WEIGHT: 173.06 LBS | RESPIRATION RATE: 17 BRPM

## 2019-12-23 DIAGNOSIS — Z98.891 HISTORY OF UTERINE SCAR FROM PREVIOUS SURGERY: Chronic | ICD-10-CM

## 2019-12-23 LAB
ALBUMIN SERPL ELPH-MCNC: 3.4 G/DL — SIGNIFICANT CHANGE UP (ref 3.3–5)
ALP SERPL-CCNC: 101 U/L — SIGNIFICANT CHANGE UP (ref 40–120)
ALT FLD-CCNC: 15 U/L — SIGNIFICANT CHANGE UP (ref 10–45)
ANION GAP SERPL CALC-SCNC: 13 MMOL/L — SIGNIFICANT CHANGE UP (ref 5–17)
APTT BLD: 32 SEC — SIGNIFICANT CHANGE UP (ref 27.5–36.3)
AST SERPL-CCNC: 14 U/L — SIGNIFICANT CHANGE UP (ref 10–40)
BASOPHILS # BLD AUTO: 0.06 K/UL — SIGNIFICANT CHANGE UP (ref 0–0.2)
BASOPHILS NFR BLD AUTO: 0.4 % — SIGNIFICANT CHANGE UP (ref 0–2)
BILIRUB SERPL-MCNC: 0.4 MG/DL — SIGNIFICANT CHANGE UP (ref 0.2–1.2)
BUN SERPL-MCNC: 12 MG/DL — SIGNIFICANT CHANGE UP (ref 7–23)
CALCIUM SERPL-MCNC: 9 MG/DL — SIGNIFICANT CHANGE UP (ref 8.4–10.5)
CHLORIDE SERPL-SCNC: 101 MMOL/L — SIGNIFICANT CHANGE UP (ref 96–108)
CO2 SERPL-SCNC: 20 MMOL/L — LOW (ref 22–31)
CREAT SERPL-MCNC: 0.75 MG/DL — SIGNIFICANT CHANGE UP (ref 0.5–1.3)
EOSINOPHIL # BLD AUTO: 0.23 K/UL — SIGNIFICANT CHANGE UP (ref 0–0.5)
EOSINOPHIL NFR BLD AUTO: 1.7 % — SIGNIFICANT CHANGE UP (ref 0–6)
GAS PNL BLDV: SIGNIFICANT CHANGE UP
GLUCOSE SERPL-MCNC: 99 MG/DL — SIGNIFICANT CHANGE UP (ref 70–99)
HCT VFR BLD CALC: 35.9 % — SIGNIFICANT CHANGE UP (ref 34.5–45)
HGB BLD-MCNC: 11.4 G/DL — LOW (ref 11.5–15.5)
IMM GRANULOCYTES NFR BLD AUTO: 0.6 % — SIGNIFICANT CHANGE UP (ref 0–1.5)
INR BLD: 1.25 RATIO — HIGH (ref 0.88–1.16)
LYMPHOCYTES # BLD AUTO: 1.83 K/UL — SIGNIFICANT CHANGE UP (ref 1–3.3)
LYMPHOCYTES # BLD AUTO: 13.5 % — SIGNIFICANT CHANGE UP (ref 13–44)
MCHC RBC-ENTMCNC: 27.1 PG — SIGNIFICANT CHANGE UP (ref 27–34)
MCHC RBC-ENTMCNC: 31.8 GM/DL — LOW (ref 32–36)
MCV RBC AUTO: 85.5 FL — SIGNIFICANT CHANGE UP (ref 80–100)
MONOCYTES # BLD AUTO: 1.13 K/UL — HIGH (ref 0–0.9)
MONOCYTES NFR BLD AUTO: 8.3 % — SIGNIFICANT CHANGE UP (ref 2–14)
NEUTROPHILS # BLD AUTO: 10.24 K/UL — HIGH (ref 1.8–7.4)
NEUTROPHILS NFR BLD AUTO: 75.5 % — SIGNIFICANT CHANGE UP (ref 43–77)
NRBC # BLD: 0 /100 WBCS — SIGNIFICANT CHANGE UP (ref 0–0)
PLATELET # BLD AUTO: 401 K/UL — HIGH (ref 150–400)
POTASSIUM SERPL-MCNC: 3.7 MMOL/L — SIGNIFICANT CHANGE UP (ref 3.5–5.3)
POTASSIUM SERPL-SCNC: 3.7 MMOL/L — SIGNIFICANT CHANGE UP (ref 3.5–5.3)
PROT SERPL-MCNC: 7.3 G/DL — SIGNIFICANT CHANGE UP (ref 6–8.3)
PROTHROM AB SERPL-ACNC: 14.3 SEC — HIGH (ref 10–12.9)
RBC # BLD: 4.2 M/UL — SIGNIFICANT CHANGE UP (ref 3.8–5.2)
RBC # FLD: 14.5 % — SIGNIFICANT CHANGE UP (ref 10.3–14.5)
SODIUM SERPL-SCNC: 134 MMOL/L — LOW (ref 135–145)
WBC # BLD: 13.57 K/UL — HIGH (ref 3.8–10.5)
WBC # FLD AUTO: 13.57 K/UL — HIGH (ref 3.8–10.5)

## 2019-12-23 PROCEDURE — 74177 CT ABD & PELVIS W/CONTRAST: CPT | Mod: 26

## 2019-12-23 PROCEDURE — 99285 EMERGENCY DEPT VISIT HI MDM: CPT

## 2019-12-23 NOTE — ED ADULT NURSE NOTE - OBJECTIVE STATEMENT
34y Female A&Ox3 came to ED c/o ABD pain, f/c. PMH Asthma,  19.  Pt states she has been having fevers, chills and ABD cramping for 2 days, Pt went to OB GYN MD and was told to come to ED.  Pt presents with 0/10 ABD pain on rest, 5/10 when walking.  Denies chest pain, sob, ha, n/v/d, chills at the moment, urinary symptoms, hematuria. Upon examination, full facial symmetry, no JVD or trach deviation, lung sounds clear and adequate chest rise, S1 and S2 heart sounds present, +2 pulses in all extremities with full ROM and equal strength, cap refill <2 seconds, ABD soft, non distended, Pt has a mid ABD hernia that has been d/x as benign, pelvis intact, Pt reports normal bowel movements.

## 2019-12-23 NOTE — ED PROVIDER NOTE - RAPID ASSESSMENT
Praveen Spicer rapid assessment documentation for Maryjo Francis (PA-C):    34 year old female with no significant pmhx or pshx presents to the ED c/o abdominal cramping, fever (Tmax 100.8F)/chills starting x2 days ago. +vaginal bleeding (x2-3 pads/day), urinary frequency. Pt referred to [facility] ED by Ob/gyn, Dr. Miranda, for CT-scan s/p  on  for the symptoms. Has been taking Ibuprofen with mild symptomatic relief. Denies n/v, burning urination. Praveen Spicer rapid assessment documentation for Maryjo Francis (PA-C):    34 year old female with no significant pmhx, 11 days s/p c/s presents to the ED c/o abdominal cramping, fever (Tmax 100.8F)/chills starting x2 days ago. +vaginal bleeding (x2-3 pads/day), urinary frequency. Reports abdominal cramping worse today. Pt referred to [facility] ED by Ob/gyn, Dr. Miranda, stated she needed to come here for 'possible CT scan.' Patient denies any nausea, vomiting, diarrhea. Praveen Spicer rapid assessment documentation for Maryjo Francis (PA-C):    34 year old female with no significant pmhx, 11 days s/p c/s presents to the ED c/o abdominal cramping, fever (Tmax 100.8F)/chills starting x2 days ago. +vaginal bleeding (x2-3 pads/day), urinary frequency. Reports abdominal cramping worse today. Pt referred to [facility] ED by Ob/gyn, Dr. Miranda, stated she needed to come here for 'possible CT scan.' Patient denies any nausea, vomiting, diarrhea.  Spoke with OBGYN resident recommended CT a/p to r/o post op collection. Will see patient in main.

## 2019-12-23 NOTE — ED PROVIDER NOTE - CLINICAL SUMMARY MEDICAL DECISION MAKING FREE TEXT BOX
34F PMH asthma, POD11 s/p  now presenting with 2 days of abdominal pain and fevers/chills.  Concern for post-op collection.  Will scan and reassess.

## 2019-12-23 NOTE — ED PROVIDER NOTE - ATTENDING CONTRIBUTION TO CARE
34 F w/ pmh of asthma, presents to the ED w/ fever and chills since saturday. reports c section on 12/12 (elective 2/2 prior hx of csection) no hx of gestational complications gbs negative, per the patient, normal lochia, minimal bleeding no foul smelling discharge. pt reports no vomiting, + nausea, no cough, no dysuria, no flank pain. pt breast feeding w/ no breast discharge, no erythema of breasts no rashs.   seen by obgyn today and had a pelvic exam, sent to the ED for ct scan. to eval for pos op infection.   I have reviewed the triage vital signs. Const: Well-nourished, Well-developed, Eyes: no conjunctival injection and no scleral icterus ENMT: Moist mucus membranes, CVS: +S1/S2, radial/DP pulse 2+ bilaterally  RESP: Unlabored respiratory effort, Clear to auscultation bilaterally GI: Nontender/Nondistended soft abdomen, no CVA tenderness, c section scar w/ no midline tenderness no surrounding erytjema no purulence from the wound, tenderness in the lower abdomen R>L MSK: Extremities w/o deformity or ttp, Psych: Awake, Alert, & Orientedx3;  Appropriate mood and affect, cooperative  plan for labs, ct scan and obgyn consult for possible post operative infection.

## 2019-12-23 NOTE — ED PROVIDER NOTE - OBJECTIVE STATEMENT
35F PMH asthma s/p  on Dec 12th (POD 11) presenting with abdominal cramping, fever starting 2 days ago.  Pt states she had been having abdominal cramping that became worse today.  She states she called her OB/Gyn who stated she should go to the ED for CT scan because she had been also having fevers and chills.  Pt here for CT a/p to r/o post-op collection.

## 2019-12-24 ENCOUNTER — TRANSCRIPTION ENCOUNTER (OUTPATIENT)
Age: 34
End: 2019-12-24

## 2019-12-24 VITALS
SYSTOLIC BLOOD PRESSURE: 102 MMHG | TEMPERATURE: 98 F | RESPIRATION RATE: 18 BRPM | OXYGEN SATURATION: 96 % | HEART RATE: 82 BPM | DIASTOLIC BLOOD PRESSURE: 67 MMHG

## 2019-12-24 DIAGNOSIS — T81.40XA INFECTION FOLLOWING A PROCEDURE, UNSPECIFIED, INITIAL ENCOUNTER: ICD-10-CM

## 2019-12-24 LAB
APPEARANCE UR: ABNORMAL
APTT BLD: 34.1 SEC — SIGNIFICANT CHANGE UP (ref 27.5–36.3)
BACTERIA # UR AUTO: NEGATIVE — SIGNIFICANT CHANGE UP
BASOPHILS # BLD AUTO: 0.06 K/UL — SIGNIFICANT CHANGE UP (ref 0–0.2)
BASOPHILS NFR BLD AUTO: 0.5 % — SIGNIFICANT CHANGE UP (ref 0–2)
BILIRUB UR-MCNC: NEGATIVE — SIGNIFICANT CHANGE UP
BLD GP AB SCN SERPL QL: NEGATIVE — SIGNIFICANT CHANGE UP
COLOR SPEC: YELLOW — SIGNIFICANT CHANGE UP
DIFF PNL FLD: ABNORMAL
EOSINOPHIL # BLD AUTO: 0.28 K/UL — SIGNIFICANT CHANGE UP (ref 0–0.5)
EOSINOPHIL NFR BLD AUTO: 2.2 % — SIGNIFICANT CHANGE UP (ref 0–6)
EPI CELLS # UR: 1 /HPF — SIGNIFICANT CHANGE UP
GLUCOSE UR QL: NEGATIVE — SIGNIFICANT CHANGE UP
GRAM STN FLD: SIGNIFICANT CHANGE UP
HCT VFR BLD CALC: 37 % — SIGNIFICANT CHANGE UP (ref 34.5–45)
HGB BLD-MCNC: 11.6 G/DL — SIGNIFICANT CHANGE UP (ref 11.5–15.5)
HYALINE CASTS # UR AUTO: 1 /LPF — SIGNIFICANT CHANGE UP (ref 0–2)
IMM GRANULOCYTES NFR BLD AUTO: 0.8 % — SIGNIFICANT CHANGE UP (ref 0–1.5)
INR BLD: 1.21 RATIO — HIGH (ref 0.88–1.16)
KETONES UR-MCNC: SIGNIFICANT CHANGE UP
LEUKOCYTE ESTERASE UR-ACNC: ABNORMAL
LYMPHOCYTES # BLD AUTO: 1.29 K/UL — SIGNIFICANT CHANGE UP (ref 1–3.3)
LYMPHOCYTES # BLD AUTO: 10.2 % — LOW (ref 13–44)
MCHC RBC-ENTMCNC: 27 PG — SIGNIFICANT CHANGE UP (ref 27–34)
MCHC RBC-ENTMCNC: 31.4 GM/DL — LOW (ref 32–36)
MCV RBC AUTO: 86 FL — SIGNIFICANT CHANGE UP (ref 80–100)
MONOCYTES # BLD AUTO: 1.02 K/UL — HIGH (ref 0–0.9)
MONOCYTES NFR BLD AUTO: 8 % — SIGNIFICANT CHANGE UP (ref 2–14)
NEUTROPHILS # BLD AUTO: 9.93 K/UL — HIGH (ref 1.8–7.4)
NEUTROPHILS NFR BLD AUTO: 78.3 % — HIGH (ref 43–77)
NITRITE UR-MCNC: NEGATIVE — SIGNIFICANT CHANGE UP
NRBC # BLD: 0 /100 WBCS — SIGNIFICANT CHANGE UP (ref 0–0)
PH UR: 6 — SIGNIFICANT CHANGE UP (ref 5–8)
PLATELET # BLD AUTO: 337 K/UL — SIGNIFICANT CHANGE UP (ref 150–400)
PROT UR-MCNC: ABNORMAL
PROTHROM AB SERPL-ACNC: 14 SEC — HIGH (ref 10–12.9)
RBC # BLD: 4.3 M/UL — SIGNIFICANT CHANGE UP (ref 3.8–5.2)
RBC # FLD: 14.6 % — HIGH (ref 10.3–14.5)
RBC CASTS # UR COMP ASSIST: 18 /HPF — HIGH (ref 0–4)
RH IG SCN BLD-IMP: POSITIVE — SIGNIFICANT CHANGE UP
SP GR SPEC: 1.03 — HIGH (ref 1.01–1.02)
SPECIMEN SOURCE: SIGNIFICANT CHANGE UP
UROBILINOGEN FLD QL: NEGATIVE — SIGNIFICANT CHANGE UP
WBC # BLD: 12.68 K/UL — HIGH (ref 3.8–10.5)
WBC # FLD AUTO: 12.68 K/UL — HIGH (ref 3.8–10.5)
WBC UR QL: 224 /HPF — HIGH (ref 0–5)

## 2019-12-24 PROCEDURE — 81001 URINALYSIS AUTO W/SCOPE: CPT

## 2019-12-24 PROCEDURE — 82947 ASSAY GLUCOSE BLOOD QUANT: CPT

## 2019-12-24 PROCEDURE — 86850 RBC ANTIBODY SCREEN: CPT

## 2019-12-24 PROCEDURE — 85027 COMPLETE CBC AUTOMATED: CPT

## 2019-12-24 PROCEDURE — 83605 ASSAY OF LACTIC ACID: CPT

## 2019-12-24 PROCEDURE — 99285 EMERGENCY DEPT VISIT HI MDM: CPT

## 2019-12-24 PROCEDURE — 85730 THROMBOPLASTIN TIME PARTIAL: CPT

## 2019-12-24 PROCEDURE — 87086 URINE CULTURE/COLONY COUNT: CPT

## 2019-12-24 PROCEDURE — 87075 CULTR BACTERIA EXCEPT BLOOD: CPT

## 2019-12-24 PROCEDURE — 87040 BLOOD CULTURE FOR BACTERIA: CPT

## 2019-12-24 PROCEDURE — C1729: CPT

## 2019-12-24 PROCEDURE — 80053 COMPREHEN METABOLIC PANEL: CPT

## 2019-12-24 PROCEDURE — 82435 ASSAY OF BLOOD CHLORIDE: CPT

## 2019-12-24 PROCEDURE — C1769: CPT

## 2019-12-24 PROCEDURE — 84132 ASSAY OF SERUM POTASSIUM: CPT

## 2019-12-24 PROCEDURE — 10030 IMG GID FLU COLL DRG SFT TIS: CPT

## 2019-12-24 PROCEDURE — 82803 BLOOD GASES ANY COMBINATION: CPT

## 2019-12-24 PROCEDURE — 85014 HEMATOCRIT: CPT

## 2019-12-24 PROCEDURE — 87070 CULTURE OTHR SPECIMN AEROBIC: CPT

## 2019-12-24 PROCEDURE — 74177 CT ABD & PELVIS W/CONTRAST: CPT

## 2019-12-24 PROCEDURE — 86901 BLOOD TYPING SEROLOGIC RH(D): CPT

## 2019-12-24 PROCEDURE — 82330 ASSAY OF CALCIUM: CPT

## 2019-12-24 PROCEDURE — 84295 ASSAY OF SERUM SODIUM: CPT

## 2019-12-24 PROCEDURE — 87205 SMEAR GRAM STAIN: CPT

## 2019-12-24 PROCEDURE — 94640 AIRWAY INHALATION TREATMENT: CPT

## 2019-12-24 PROCEDURE — 85610 PROTHROMBIN TIME: CPT

## 2019-12-24 PROCEDURE — 86900 BLOOD TYPING SEROLOGIC ABO: CPT

## 2019-12-24 RX ORDER — ACETAMINOPHEN 500 MG
975 TABLET ORAL EVERY 6 HOURS
Refills: 0 | Status: DISCONTINUED | OUTPATIENT
Start: 2019-12-24 | End: 2019-12-24

## 2019-12-24 RX ORDER — IBUPROFEN 200 MG
600 TABLET ORAL EVERY 6 HOURS
Refills: 0 | Status: DISCONTINUED | OUTPATIENT
Start: 2019-12-24 | End: 2019-12-24

## 2019-12-24 RX ORDER — ALBUTEROL 90 UG/1
2 AEROSOL, METERED ORAL
Qty: 0 | Refills: 0 | DISCHARGE

## 2019-12-24 RX ORDER — FLUTICASONE PROPIONATE AND SALMETEROL 50; 250 UG/1; UG/1
2 POWDER ORAL; RESPIRATORY (INHALATION)
Qty: 0 | Refills: 0 | DISCHARGE

## 2019-12-24 RX ORDER — ALBUTEROL 90 UG/1
2 AEROSOL, METERED ORAL EVERY 6 HOURS
Refills: 0 | Status: DISCONTINUED | OUTPATIENT
Start: 2019-12-24 | End: 2019-12-24

## 2019-12-24 RX ORDER — GENTAMICIN SULFATE 40 MG/ML
390 VIAL (ML) INJECTION EVERY 24 HOURS
Refills: 0 | Status: DISCONTINUED | OUTPATIENT
Start: 2019-12-24 | End: 2019-12-24

## 2019-12-24 RX ORDER — BUDESONIDE AND FORMOTEROL FUMARATE DIHYDRATE 160; 4.5 UG/1; UG/1
2 AEROSOL RESPIRATORY (INHALATION)
Refills: 0 | Status: DISCONTINUED | OUTPATIENT
Start: 2019-12-24 | End: 2019-12-24

## 2019-12-24 RX ORDER — SIMETHICONE 80 MG/1
80 TABLET, CHEWABLE ORAL
Refills: 0 | Status: DISCONTINUED | OUTPATIENT
Start: 2019-12-24 | End: 2019-12-24

## 2019-12-24 RX ORDER — SODIUM CHLORIDE 9 MG/ML
1000 INJECTION, SOLUTION INTRAVENOUS
Refills: 0 | Status: COMPLETED | OUTPATIENT
Start: 2019-12-24 | End: 2019-12-24

## 2019-12-24 RX ADMIN — SODIUM CHLORIDE 125 MILLILITER(S): 9 INJECTION, SOLUTION INTRAVENOUS at 03:52

## 2019-12-24 RX ADMIN — Medication 1 TABLET(S): at 19:53

## 2019-12-24 RX ADMIN — Medication 100 MILLIGRAM(S): at 12:41

## 2019-12-24 RX ADMIN — BUDESONIDE AND FORMOTEROL FUMARATE DIHYDRATE 2 PUFF(S): 160; 4.5 AEROSOL RESPIRATORY (INHALATION) at 18:32

## 2019-12-24 RX ADMIN — Medication 975 MILLIGRAM(S): at 14:36

## 2019-12-24 RX ADMIN — Medication 975 MILLIGRAM(S): at 15:15

## 2019-12-24 RX ADMIN — Medication 500 MILLIGRAM(S): at 03:51

## 2019-12-24 RX ADMIN — BUDESONIDE AND FORMOTEROL FUMARATE DIHYDRATE 2 PUFF(S): 160; 4.5 AEROSOL RESPIRATORY (INHALATION) at 05:57

## 2019-12-24 RX ADMIN — Medication 100 MILLIGRAM(S): at 02:51

## 2019-12-24 NOTE — PROGRESS NOTE ADULT - SUBJECTIVE AND OBJECTIVE BOX
Interventional Radiology Brief Post-Procedure Note    Procedure: Abdominal wall fluid collection drainage catheter placement    Operators: Agustín Redding MD    Anesthesia (type): Provided by the anesthesiology service    Contrast: None.    EBL: Minimal    Findings/Follow up Plan of Care: Successful 10 Spanish drain placement in lower abdominal wall fluid collection. 15 mL serous fluid aspirated.    Specimens Removed: 15 mL serous fluid from anterior abdominal wall fluid collection.    Implants: 10 Spanish drainage catheter.    Complications: No immediate complications.    Condition/Disposition: To recovery room.     Please call Interventional Radiology x [phone] with any questions, concerns, or issues.

## 2019-12-24 NOTE — DISCHARGE NOTE PROVIDER - HOSPITAL COURSE
Pt was admitted with 10cm abdominal wall collection. Pt noted fevers at home. Pt was given gentamycin and clindamycin. On HD#2, she had IR drainage of the collection. She was the switched to augmentin and discharged home. Pt will follow up with IR for drain study next week.

## 2019-12-24 NOTE — H&P ADULT - HISTORY OF PRESENT ILLNESS
35 y/o  POD#13 s/p scheduled repeat C/S, presents from the office after c/o fever and abdominal pain since Saturday. Patient seen in the office for routine postpartum visit. Patient reports she had a fever of 100.8 last night (t-max). Fever has been associated w/ chills. She also reports feeling pain in the center of the abdomen while walking, which resolves when sitting. Pain is located in the center, is non-radiating, and is relieved by ibuprofen. Patient has not required additional pain medications since the C/S. Denies associated n/v, pelvic pain, dysuria, back pain, CP/SOB, LE pain. Patient is not breast feeding. She had brief breast discomfort a week ago which resolved after pumping for a few minutes. Denies changes in the skin of her breasts. Denies URI symptoms. No sick contacts. She is currently still having vaginal bleeding, like a period, with lighter days.     Primary OB/GYN: Dr. Mrianda    OBH: 19 repeat C/S;  FT C/S for suspected macrosomia  GYNHx: denies fibroids/cysts/abl paps  PMHx - Asthma (>1yr since last exacerbation, however used a rescue inhaler a couple of times during pregnancy due to SOB; never intubated or hospitalized); h/o kidney stone  PSHx - C/S x2  Meds - motrin, Advair qd; albuterol prn

## 2019-12-24 NOTE — H&P ADULT - NSHPPHYSICALEXAM_GEN_ALL_CORE
Vital Signs Last 24 Hrs  T(C): 36.5 (24 Dec 2019 00:32), Max: 36.9 (23 Dec 2019 23:00)  T(F): 97.7 (24 Dec 2019 00:32), Max: 98.4 (23 Dec 2019 23:00)  HR: 80 (24 Dec 2019 00:32) (80 - 99)  BP: 105/71 (24 Dec 2019 00:32) (105/71 - 130/90)  BP(mean): --  RR: 20 (24 Dec 2019 00:32) (17 - 20)  SpO2: 99% (24 Dec 2019 00:32) (99% - 100%)  Height (cm): 170.18 (12-23-19 @ 18:54)  Weight (kg): 78.5 (12-23-19 @ 18:54)  BMI (kg/m2): 27.1 (12-23-19 @ 18:54)  BSA (m2): 1.9 (12-23-19 @ 18:54)      PHYSICAL EXAM:  GEN: NAD, A&Ox3  CV: RRR  LUNGS: CTAB  BREASTS: No tenderness, erythema, or fluctuant masses b/l   ABD: soft, mildly tender in the center of the abdomen; no suprapubic tenderness; no rebound; no guarding  PELVIC: declined (patient examined in the office)  EXT: no LE edema/tenderness

## 2019-12-24 NOTE — CHART NOTE - NSCHARTNOTEFT_GEN_A_CORE
------------------------------------------------------------  Interventional Radiology Pre-Procedure Note  ------------------------------------------------------------    Procedure:    Indication: 34y Female POD 12 after  with abdominal pain and fever found to have abdominal wall collection on CT.     Past Medical History:    Asthma      Allergies: dust (Eye Irritation; Rhinorrhea)  No Known Drug Allergies      Vital Signs: T(F): 98.2 (09:19), Max: 98.4 (23:00)  HR: 96 (09:19)  BP: 136/86 (09:19)  RR: 17 (09:19)  SpO2: 99% (09:19)           11.6  12.68)-----(337     (19 @ 06:51)         37.0     134 | 101 | 12  ----------------------< 99     (19 @ 21:19)  3.7 | 20 | 0.75       PT: 14.0<H> 19 @ 06:51  aPTT: 34.1 19 @ 06:51   INR: 1.21<H> 19 @ 06:51      Imaging: CT scan showing 2.8 x 10.7 x 2.3 cm anterior abdominal wall rim-enhancing collection, concerning for abscess.     Consent: Obtained    Plan:  Image guided aspiration with possible drain placement.

## 2019-12-24 NOTE — DISCHARGE NOTE NURSING/CASE MANAGEMENT/SOCIAL WORK - PATIENT PORTAL LINK FT
You can access the FollowMyHealth Patient Portal offered by [facility] by registering at the following website: [email or web address]. By joining [employer]’s FollowMyHealth portal, you will also be able to view your health information using other applications (apps) compatible with our system.

## 2019-12-24 NOTE — DISCHARGE NOTE PROVIDER - NSDCMRMEDTOKEN_GEN_ALL_CORE_FT
Actamin 325 mg oral tablet: 3 tab(s) orally   Advair HFA 45 mcg-21 mcg/inh inhalation aerosol: 2 puff(s) inhaled 2 times a day  albuterol 90 mcg/inh inhalation aerosol: 2 puff(s) inhaled 4 times a day, As Needed  amoxicillin-clavulanate 500 mg-125 mg oral tablet: 1 tab(s) orally 2 times a day  ibuprofen 600 mg oral tablet: 1 tab(s) orally every 6 hours  oxyCODONE 5 mg oral tablet: 1 tab(s) orally every 4 to 6 hours, As Needed -10)  Prenatal Multivitamins with Folic Acid 1 mg oral tablet: 1 tab(s) orally once a day

## 2019-12-24 NOTE — PROGRESS NOTE ADULT - SUBJECTIVE AND OBJECTIVE BOX
S:  mild incisional discomfort  O:  Afebrile on admission.  VSS  abd soft, minimal tenderness.  some echhymoses around the incision line  fundus minimally tender  no calf, cvat  wbc 13.57  ct with 10.2 cm collection  A:  post op seroma  P;  spoke with interventional radiology who reviewed the images and feel they can drain the collection.  They are planning to do it today.      patient is aware of the plan and in agreement.  discharge will be as per IR  would consider ab at home upon discharge

## 2019-12-24 NOTE — DISCHARGE NOTE PROVIDER - NSDCFUSCHEDAPPT_GEN_ALL_CORE_FT
CLARK BRICEÑO ; 01/02/2020 ; [facility] [id number] CLARK BRICEÑO ; 01/02/2020 ; [facility] [id number]

## 2019-12-24 NOTE — H&P ADULT - NSHPLABSRESULTS_GEN_ALL_CORE
11.4   13.57 )-----------( 401      ( 23 Dec 2019 21:19 )             35.9   baso 0.4    eos 1.7    imm gran 0.6    lymph 13.5   mono 8.3    poly 75.5           134<L>  |  101  |  12  ----------------------------<  99  3.7   |  20<L>  |  0.75    Ca    9.0      23 Dec 2019 21:19    TPro  7.3  /  Alb  3.4  /  TBili  0.4  /  DBili  x   /  AST  14  /  ALT  15  /  AlkPhos  101        Urinalysis Basic - ( 23 Dec 2019 23:59 )    Color: Yellow / Appearance: Slightly Turbid / S.035 / pH: x  Gluc: x / Ketone: Trace  / Bili: Negative / Urobili: Negative   Blood: x / Protein: 30 mg/dL / Nitrite: Negative   Leuk Esterase: Large / RBC: 18 /hpf /  /HPF   Sq Epi: x / Non Sq Epi: 1 /hpf / Bacteria: Negative      RADIOLOGY:  EXAM:  CT ABDOMEN AND PELVIS OC IC                        PROCEDURE DATE:  2019      INTERPRETATION:  CLINICAL INFORMATION: Abdominal pain. Fever. Status post  11 days ago. Concern for postoperative collection.     COMPARISON: Abdominal ultrasound 2019.    PROCEDURE:   CT of the Abdomen and Pelvis was performed with intravenous contrast.   Intravenous contrast: 90 ml Omnipaque 350. 10 ml discarded.  Oral contrast: positive contrast was administered.  Sagittal and coronal reformats were performed.    FINDINGS:    LOWER CHEST: Within normal limits.    LIVER: Within normal limits.  BILE DUCTS: Normal caliber.  GALLBLADDER: Within normal limits.  SPLEEN: Within normal limits.  PANCREAS: Within normal limits.  ADRENALS: Within normal limits.  KIDNEYS/URETERS: Bilateral symmetric enhancement. No hydronephrosis.    BLADDER: Thickened versus underdistention. Small bladder flap hematoma noted.  REPRODUCTIVE ORGANS: Postpartum uterus. No adnexal masses.    BOWEL: Air distended colon. No bowel obstruction.   PERITONEUM: Trace pelvic ascites.  VESSELS: Within normal limits.  RETROPERITONEUM/LYMPH NODES: No lymphadenopathy.    ABDOMINAL WALL: 2.7 x 10.8 x 2.3 cm rim-enhancing collection along the mid abdominal wall. Associated soft tissue inflammatory changes and edema are noted.  BONES: Within normal limits.    IMPRESSION:   10.8 cm rim-enhancing collection along the mid abdominal wall. Findings may represent a postoperative seroma, however, superinfection cannot be excluded.  Thickened versus underdistended bladder. Correlate with urinalysis.  Small bladder flap hematoma noted.

## 2019-12-24 NOTE — DISCHARGE NOTE PROVIDER - CARE PROVIDER_API CALL
Sunita Castillo (DO)  [facility] [address], [address]  [place] [id number]  Phone: [phone]  Fax: [phone]  Follow Up Time:

## 2019-12-24 NOTE — H&P ADULT - ATTENDING COMMENTS
Agree with above. Patient was fully examined in the office today by dr. Miranda.    Plan:  Consult IR for drain.  Awaiting cultures (urine and blood cultures)  IV antibiotics to transition to PO upon discharge  Trend WBCs    Sunita Castillo DO

## 2019-12-25 LAB
CULTURE RESULTS: SIGNIFICANT CHANGE UP
SPECIMEN SOURCE: SIGNIFICANT CHANGE UP

## 2019-12-29 LAB
CULTURE RESULTS: SIGNIFICANT CHANGE UP
SPECIMEN SOURCE: SIGNIFICANT CHANGE UP

## 2019-12-30 PROBLEM — J45.909 UNSPECIFIED ASTHMA, UNCOMPLICATED: Chronic | Status: ACTIVE | Noted: 2019-12-23

## 2020-01-02 ENCOUNTER — APPOINTMENT (OUTPATIENT)
Dept: CT IMAGING | Facility: HOSPITAL | Age: 35
End: 2020-01-02

## 2020-01-02 ENCOUNTER — OUTPATIENT (OUTPATIENT)
Dept: OUTPATIENT SERVICES | Facility: HOSPITAL | Age: 35
LOS: 1 days | End: 2020-01-02
Payer: COMMERCIAL

## 2020-01-02 DIAGNOSIS — J98.01 ACUTE BRONCHOSPASM: ICD-10-CM

## 2020-01-02 DIAGNOSIS — Z98.891 HISTORY OF UTERINE SCAR FROM PREVIOUS SURGERY: Chronic | ICD-10-CM

## 2020-01-02 PROCEDURE — 49424 ASSESS CYST CONTRAST INJECT: CPT

## 2020-01-02 PROCEDURE — 76080 X-RAY EXAM OF FISTULA: CPT | Mod: 26

## 2020-01-02 PROCEDURE — 74176 CT ABD & PELVIS W/O CONTRAST: CPT

## 2020-01-02 PROCEDURE — 76080 X-RAY EXAM OF FISTULA: CPT

## 2020-01-02 PROCEDURE — 74176 CT ABD & PELVIS W/O CONTRAST: CPT | Mod: 26

## 2020-01-09 DIAGNOSIS — Z43.4 ENCOUNTER FOR ATTENTION TO OTHER ARTIFICIAL OPENINGS OF DIGESTIVE TRACT: ICD-10-CM

## 2020-01-15 NOTE — PRE-ANESTHESIA EVALUATION ADULT - NSANTHAIRWAYFT_ENT_ALL_CORE
Immediate Brief Procedure Note    Patient: Tressa Rahman    Pre-op Dx: tlh bso and nodes    Post-op Dx: same    Procedure: ut cancer/CAH cannot rule out    Surgeon:  Larry Shaw MD    Assistants: hardeep    Anesthesia Staff: Anesthesiologist: Rob Easley MD  Anesthesia Assistant: Lukas Castillo    Anesthesia Type: gen    Findings: see dictate    Estimated Blood Loss: min    Complications: none    Specimens Removed: ut bso and wash and nodes   good mouth opening Good mouth opening, 3 FB TM distance, full neck ROM

## 2020-01-22 ENCOUNTER — APPOINTMENT (OUTPATIENT)
Dept: PULMONOLOGY | Facility: CLINIC | Age: 35
End: 2020-01-22
Payer: COMMERCIAL

## 2020-01-22 VITALS
SYSTOLIC BLOOD PRESSURE: 114 MMHG | WEIGHT: 168 LBS | HEIGHT: 67 IN | OXYGEN SATURATION: 97 % | BODY MASS INDEX: 26.37 KG/M2 | DIASTOLIC BLOOD PRESSURE: 75 MMHG | HEART RATE: 72 BPM

## 2020-01-22 PROCEDURE — 99213 OFFICE O/P EST LOW 20 MIN: CPT | Mod: 25

## 2020-01-22 PROCEDURE — 95012 NITRIC OXIDE EXP GAS DETER: CPT

## 2020-01-22 PROCEDURE — 94060 EVALUATION OF WHEEZING: CPT

## 2020-01-22 RX ORDER — ALBUTEROL SULFATE 90 UG/1
108 (90 BASE) AEROSOL, METERED RESPIRATORY (INHALATION)
Qty: 1 | Refills: 0 | Status: DISCONTINUED | COMMUNITY
Start: 2018-07-25 | End: 2020-01-22

## 2020-01-22 RX ORDER — FLUTICASONE PROPIONATE AND SALMETEROL XINAFOATE 45; 21 UG/1; UG/1
45-21 AEROSOL, METERED RESPIRATORY (INHALATION) TWICE DAILY
Qty: 1 | Refills: 3 | Status: DISCONTINUED | COMMUNITY
Start: 2018-08-16 | End: 2020-01-22

## 2020-01-22 RX ORDER — MONTELUKAST 10 MG/1
10 TABLET, FILM COATED ORAL
Qty: 1 | Refills: 3 | Status: DISCONTINUED | COMMUNITY
Start: 2018-08-16 | End: 2020-01-22

## 2020-01-22 NOTE — DISCUSSION/SUMMARY
[FreeTextEntry1] : chronic persistent asthma\par Rx updated\par Increase ADVAIR 115-21 MCG 2 PUFFS bid AND rINSE\par  Notify of any wheezing.  Notify if rescue inhaler is needed greater than 2-3 times in any week.  Avoid known triggers.\par

## 2020-01-22 NOTE — PROCEDURE
[FreeTextEntry1] : Exhaled nitric oxide 19  ppb 1/22/20 and prior 39 ppb\par Spirometry 1/22/2020\par  mild OAD\par  Pos 16 % BD at FEV1

## 2020-01-22 NOTE — REVIEW OF SYSTEMS
[As Noted in HPI] : as noted in HPI [Negative] : Psychiatric [de-identified] : does have Cat Allergy

## 2020-01-22 NOTE — PHYSICAL EXAM
[General Appearance - Well Developed] : well developed [Normal Appearance] : normal appearance [Well Groomed] : well groomed [General Appearance - Well Nourished] : well nourished [General Appearance - In No Acute Distress] : no acute distress [No Deformities] : no deformities [Normal Conjunctiva] : the conjunctiva exhibited no abnormalities [Normal Oropharynx] : normal oropharynx [Eyelids - No Xanthelasma] : the eyelids demonstrated no xanthelasmas [Neck Cervical Mass (___cm)] : no neck mass was observed [Neck Appearance] : the appearance of the neck was normal [Jugular Venous Distention Increased] : there was no jugular-venous distention [Heart Rate And Rhythm] : heart rate and rhythm were normal [Thyroid Diffuse Enlargement] : the thyroid was not enlarged [Murmurs] : no murmurs present [Heart Sounds] : normal S1 and S2 [Arterial Pulses Normal] : the arterial pulses were normal [Veins - Varicosity Changes] : no varicosital changes were noted in the lower extremities [Edema] : no peripheral edema present [Exaggerated Use Of Accessory Muscles For Inspiration] : no accessory muscle use [Respiration, Rhythm And Depth] : normal respiratory rhythm and effort [Auscultation Breath Sounds / Voice Sounds] : lungs were clear to auscultation bilaterally [Lungs Percussion] : the lungs were normal to percussion [Chest Palpation] : palpation of the chest revealed no abnormalities [Abdomen Soft] : soft [Bowel Sounds] : normal bowel sounds [Abdomen Tenderness] : non-tender [Abdomen Mass (___ Cm)] : no abdominal mass palpated [Nail Clubbing] : no clubbing of the fingernails [Abnormal Walk] : normal gait [Petechial Hemorrhages (___cm)] : no petechial hemorrhages [Cyanosis, Localized] : no localized cyanosis [] : no rash [Skin Color & Pigmentation] : normal skin color and pigmentation [No Venous Stasis] : no venous stasis [No Skin Ulcers] : no skin ulcer [No Xanthoma] : no  xanthoma was observed [Skin Lesions] : no skin lesions [Sensation] : the sensory exam was normal to light touch and pinprick [Deep Tendon Reflexes (DTR)] : deep tendon reflexes were 2+ and symmetric [Impaired Insight] : insight and judgment were intact [No Focal Deficits] : no focal deficits [Oriented To Time, Place, And Person] : oriented to person, place, and time [Affect] : the affect was normal [FreeTextEntry1] : no thrush

## 2020-01-22 NOTE — HISTORY OF PRESENT ILLNESS
[Feelings Of Weakness On Exertion] : denies exercise intolerance [Difficulty Breathing During Exertion] : denies dyspnea on exertion [Regional Soft Tissue Swelling Both Lower Extremities] : denies lower extremity edema [Cough] : denies coughing [Wheezing] : denies wheezing [Chest Pain Or Discomfort] : denies chest pain [Fever] : denies fever [Never] : was never a smoker [Wt Loss ___ Lbs] : no recent weight loss [FreeTextEntry1] : notes without use of advair  did get more asthma like symptoms

## 2020-08-12 ENCOUNTER — APPOINTMENT (OUTPATIENT)
Dept: PULMONOLOGY | Facility: CLINIC | Age: 35
End: 2020-08-12
Payer: COMMERCIAL

## 2020-08-12 VITALS
RESPIRATION RATE: 16 BRPM | OXYGEN SATURATION: 98 % | DIASTOLIC BLOOD PRESSURE: 78 MMHG | SYSTOLIC BLOOD PRESSURE: 120 MMHG | TEMPERATURE: 98.1 F | HEART RATE: 98 BPM

## 2020-08-12 DIAGNOSIS — Z20.828 CONTACT WITH AND (SUSPECTED) EXPOSURE TO OTHER VIRAL COMMUNICABLE DISEASES: ICD-10-CM

## 2020-08-12 PROCEDURE — 99213 OFFICE O/P EST LOW 20 MIN: CPT

## 2020-08-12 NOTE — PHYSICAL EXAM
[General Appearance - Well Developed] : well developed [Normal Appearance] : normal appearance [Well Groomed] : well groomed [General Appearance - Well Nourished] : well nourished [No Deformities] : no deformities [General Appearance - In No Acute Distress] : no acute distress [Normal Conjunctiva] : the conjunctiva exhibited no abnormalities [Eyelids - No Xanthelasma] : the eyelids demonstrated no xanthelasmas [Normal Oropharynx] : normal oropharynx [Neck Appearance] : the appearance of the neck was normal [Neck Cervical Mass (___cm)] : no neck mass was observed [Jugular Venous Distention Increased] : there was no jugular-venous distention [Thyroid Diffuse Enlargement] : the thyroid was not enlarged [Heart Rate And Rhythm] : heart rate and rhythm were normal [Heart Sounds] : normal S1 and S2 [Murmurs] : no murmurs present [Arterial Pulses Normal] : the arterial pulses were normal [Edema] : no peripheral edema present [Veins - Varicosity Changes] : no varicosital changes were noted in the lower extremities [Respiration, Rhythm And Depth] : normal respiratory rhythm and effort [Exaggerated Use Of Accessory Muscles For Inspiration] : no accessory muscle use [Auscultation Breath Sounds / Voice Sounds] : lungs were clear to auscultation bilaterally [Chest Palpation] : palpation of the chest revealed no abnormalities [Lungs Percussion] : the lungs were normal to percussion [Bowel Sounds] : normal bowel sounds [Abdomen Tenderness] : non-tender [Abdomen Soft] : soft [Abdomen Mass (___ Cm)] : no abdominal mass palpated [Abnormal Walk] : normal gait [Nail Clubbing] : no clubbing of the fingernails [Cyanosis, Localized] : no localized cyanosis [Petechial Hemorrhages (___cm)] : no petechial hemorrhages [] : no rash [Skin Color & Pigmentation] : normal skin color and pigmentation [No Venous Stasis] : no venous stasis [Skin Lesions] : no skin lesions [No Skin Ulcers] : no skin ulcer [No Xanthoma] : no  xanthoma was observed [Sensation] : the sensory exam was normal to light touch and pinprick [Deep Tendon Reflexes (DTR)] : deep tendon reflexes were 2+ and symmetric [No Focal Deficits] : no focal deficits [Oriented To Time, Place, And Person] : oriented to person, place, and time [Impaired Insight] : insight and judgment were intact [Affect] : the affect was normal [FreeTextEntry1] : no thrush

## 2020-08-12 NOTE — DISCUSSION/SUMMARY
[FreeTextEntry1] : chronic persistent asthma\par Rx updated\par Increase ADVAIR 115-21 MCG 2 PUFFS bid AND rINSE\par  Notify of any wheezing.  Notify if rescue inhaler is needed greater than 2-3 times in any week.  Avoid known triggers.\par 
DC instructions

## 2020-08-12 NOTE — REVIEW OF SYSTEMS
[As Noted in HPI] : as noted in HPI [Negative] : Psychiatric [de-identified] : does have Cat Allergy

## 2020-08-12 NOTE — HISTORY OF PRESENT ILLNESS
[Difficulty Breathing During Exertion] : denies dyspnea on exertion [Feelings Of Weakness On Exertion] : denies exercise intolerance [Cough] : denies coughing [Wheezing] : denies wheezing [Regional Soft Tissue Swelling Both Lower Extremities] : denies lower extremity edema [Chest Pain Or Discomfort] : denies chest pain [Fever] : denies fever [Never] : was never a smoker [Wt Loss ___ Lbs] : no recent weight loss [FreeTextEntry1] : notes without use of advair  did get more asthma like symptoms\par AGAPITO no m ore than 2-3 x per week\par  does have cat in home

## 2020-09-04 ENCOUNTER — RX RENEWAL (OUTPATIENT)
Age: 35
End: 2020-09-04

## 2020-09-09 ENCOUNTER — TRANSCRIPTION ENCOUNTER (OUTPATIENT)
Age: 35
End: 2020-09-09

## 2020-09-16 ENCOUNTER — APPOINTMENT (OUTPATIENT)
Dept: PULMONOLOGY | Facility: CLINIC | Age: 35
End: 2020-09-16

## 2020-09-28 NOTE — PATIENT PROFILE ADULT - HOME ACCESSIBILITY CONCERNS
Due for annual
Future Appointments:  No future appointments. Last Appointment With Me:  Visit date not found     Requested Prescriptions     Pending Prescriptions Disp Refills    rizatriptan (MAXALT-MLT) 10 mg disintegrating tablet 36 Tab 0     Sig: Let 1 tablet dissolve in mouth at onset of headache. May repeat once in 2 hours.
none

## 2020-09-30 NOTE — PATIENT PROFILE OB - BREASTFEEDING PROVIDES MATERNAL HEALTH BENEFITS, DECREASED PREMENOPAUSAL BREAST CANCER, OVARIAN CANCER AND TYPE II DIABETES MELLITUS
Cardiology Clinic note    Subjective:   Patient ID:  Nisha Mei is a 46 y.o. female who is referred by Dr Thomas for CP    HPI:   CP:  Located left sided chest pain. Associated with nausea. Radiation to the left arm. Lasts for a few seconds. Occurs less frequently now (about a week apart). Does report NOLAND on exertion, not associated with CP.  10/1/20: Walks 2 miles, x3 / week. No exertional symptoms. Noted ot be MSK with certain actions now.    Palpitations:   Resolved    Obesity: Has been working on weight loss    SH Tobacco Never used  FH No premature CAD    Patient Active Problem List    Diagnosis Date Noted    Chest wall muscle strain 09/22/2020    Chest pressure     Abnormal uterine bleeding (AUB) 12/28/2017    S/P Hysteroscopy D&C and Endometrial Ablation 12/28/2017    Overweight (BMI 25.0-29.9) 09/18/2014    Shortness of breath 09/18/2014    Chest pain 09/18/2014    Hypokalemia 09/18/2014       Patient's Medications   New Prescriptions    No medications on file   Previous Medications    ALPRAZOLAM (XANAX) 0.5 MG TABLET    Take one tablet PO 15 minutes before procedure. If still anxious at time of procedure, take another tablet PO X 1 dose    ASPIRIN (ECOTRIN) 81 MG EC TABLET    Take 81 mg by mouth once daily.    PICATO 0.015 % GEL    Apply 1 tube per day to affected area x 3 days    TRIAMCINOLONE ACETONIDE 0.1% (KENALOG) 0.1 % OINTMENT    Apply topically 3 (three) times daily.   Modified Medications    No medications on file   Discontinued Medications    No medications on file        Review of Systems   Constitution: Negative for fever.   HENT: Negative for nosebleeds.    Cardiovascular: Negative for leg swelling, near-syncope, orthopnea, palpitations, paroxysmal nocturnal dyspnea and syncope.        As above   Respiratory: Negative for hemoptysis.    Hematologic/Lymphatic: Negative for bleeding problem. Does not bruise/bleed easily.   Musculoskeletal: Negative for arthritis.  "  Gastrointestinal: Negative for hematochezia and melena.   Genitourinary: Negative for hematuria.   Neurological: Negative for seizures.   Allergic/Immunologic: Positive for environmental allergies.         Objective:   Vitals  Vitals:    10/01/20 1439   BP: 115/80   Pulse: 63   SpO2: 99%   Weight: 80 kg (176 lb 5.9 oz)          Physical Exam   Constitutional: She appears well-developed. No distress.   Eyes: Conjunctivae are normal.   Neck: Normal range of motion. No JVD present.   Cardiovascular: Normal rate and regular rhythm. Exam reveals no gallop and no friction rub.   No murmur heard.  Pulmonary/Chest: Effort normal and breath sounds normal. No respiratory distress.   Abdominal: Soft. Bowel sounds are normal. There is no abdominal tenderness.   Musculoskeletal:         General: No edema.   Neurological: She is alert.   Skin: Skin is warm. She is not diaphoretic.           Assessment:     1. Chest pain, unspecified type    2. NOLAND (dyspnea on exertion)    3. Palpitations    4. Obesity, unspecified classification, unspecified obesity type, unspecified whether serious comorbidity present        Plan:   Nisha Mei is a 46 y.o. female with no significant cardiac history    EKG personally reviewed. My interpretation:  7/16/20: NSR 60s, normal axis.     CP, NOLAND  - Nuclear stress Aug 2020: No ischemia or infarct. LVEF 63%  - Echo July 2020: LVEF 60%, normal diastology. Normal RVSF. PASP 7, CVP 3    Palpitations  - Holter July 2020: NSR avg 64. Very rare PVCs, PACs. Longest RR 1500 msec. Symptoms correlated with NSR and STac  bpm (this was while having CP)    Obesity  Body mass index is 32.26 kg/m².  Estimated body mass index is 32.26 kg/m² as calculated from the following:    Height as of 7/31/20: 5' 2" (1.575 m).    Weight as of this encounter: 80 kg (176 lb 5.9 oz).  Discussed diet and exercise    Continue with current medical plan and lifestyle changes.    No orders of the defined types were placed " in this encounter.      Follow up PRN  Return sooner for concerns or questions. If symptoms persist go to the ED    She expressed verbal understanding and agreed with the plan      Lizabeth Garber MD  Interventional Cardiology  [facility]  Phone: [phone]     Statement Selected

## 2021-03-26 ENCOUNTER — APPOINTMENT (OUTPATIENT)
Dept: PULMONOLOGY | Facility: CLINIC | Age: 36
End: 2021-03-26

## 2022-07-25 ENCOUNTER — APPOINTMENT (OUTPATIENT)
Dept: PULMONOLOGY | Facility: CLINIC | Age: 37
End: 2022-07-25

## 2022-07-25 VITALS
SYSTOLIC BLOOD PRESSURE: 115 MMHG | HEART RATE: 73 BPM | OXYGEN SATURATION: 98 % | DIASTOLIC BLOOD PRESSURE: 81 MMHG | TEMPERATURE: 98.6 F

## 2022-07-25 DIAGNOSIS — U07.1 COVID-19: ICD-10-CM

## 2022-07-25 LAB — POCT - HEMOGLOBIN (HGB), QUANTITATIVE, TRANSCUTANEOUS: 13.4

## 2022-07-25 PROCEDURE — 95012 NITRIC OXIDE EXP GAS DETER: CPT

## 2022-07-25 PROCEDURE — 94060 EVALUATION OF WHEEZING: CPT

## 2022-07-25 PROCEDURE — ZZZZZ: CPT

## 2022-07-25 PROCEDURE — 94729 DIFFUSING CAPACITY: CPT

## 2022-07-25 PROCEDURE — 88738 HGB QUANT TRANSCUTANEOUS: CPT

## 2022-07-25 PROCEDURE — 99214 OFFICE O/P EST MOD 30 MIN: CPT | Mod: 25

## 2022-07-25 PROCEDURE — 94727 GAS DIL/WSHOT DETER LNG VOL: CPT

## 2022-07-25 NOTE — DISCUSSION/SUMMARY
[FreeTextEntry1] : chronic persistent asthma\par pos bronchial inflammation with NIOX 79 ppb\par Rx updated\par Renew controler - symbicort ? adding singulair based on F/U\par  Notify of any wheezing.  Notify if rescue inhaler is needed greater than 2-3 times in any week.  Avoid known triggers.\par

## 2022-07-25 NOTE — PROCEDURE
[FreeTextEntry1] : PFT 7/25/22\par nl flow  rates pos BD at small  airways\par Lung volumes nl\par  no  airtrapping\par  DLCO 90 % pred\par HGB 13.4\par NIOX 79  ppb bronchial inflammation\par \par \par Exhaled nitric oxide 19  ppb 1/22/20 and prior 39 ppb\par Spirometry 1/22/2020\par  mild OAD\par  Pos 16 % BD at FEV1

## 2022-07-25 NOTE — HISTORY OF PRESENT ILLNESS
[Difficulty Breathing During Exertion] : denies dyspnea on exertion [Feelings Of Weakness On Exertion] : denies exercise intolerance [Cough] : denies coughing [Wheezing] : denies wheezing [Regional Soft Tissue Swelling Both Lower Extremities] : denies lower extremity edema [Chest Pain Or Discomfort] : denies chest pain [Fever] : denies fever [Never] : was never a smoker [Wt Loss ___ Lbs] : no recent weight loss [FreeTextEntry1] : asthma check am sxs upon awakening feels chest congestion pos nocturnal sxs\par  little occ wheeze but most  noted am\par sputum neg\par Covid Infection Sept 2021\par no tx \par COVID vaccine PFIZer x 3 doses\par \par notes without use of advair  did get more asthma like symptoms\par AGAPITO no m ore than 2-3 x per week\par  does have cat in home

## 2022-07-25 NOTE — PHYSICAL EXAM
[General Appearance - Well Developed] : well developed [Normal Appearance] : normal appearance [Well Groomed] : well groomed [General Appearance - Well Nourished] : well nourished [No Deformities] : no deformities [General Appearance - In No Acute Distress] : no acute distress [Normal Conjunctiva] : the conjunctiva exhibited no abnormalities [Eyelids - No Xanthelasma] : the eyelids demonstrated no xanthelasmas [Normal Oropharynx] : normal oropharynx [Neck Appearance] : the appearance of the neck was normal [Neck Cervical Mass (___cm)] : no neck mass was observed [Jugular Venous Distention Increased] : there was no jugular-venous distention [Thyroid Diffuse Enlargement] : the thyroid was not enlarged [Heart Rate And Rhythm] : heart rate and rhythm were normal [Heart Sounds] : normal S1 and S2 [Murmurs] : no murmurs present [Arterial Pulses Normal] : the arterial pulses were normal [Edema] : no peripheral edema present [Veins - Varicosity Changes] : no varicosital changes were noted in the lower extremities [Respiration, Rhythm And Depth] : normal respiratory rhythm and effort [Exaggerated Use Of Accessory Muscles For Inspiration] : no accessory muscle use [Auscultation Breath Sounds / Voice Sounds] : lungs were clear to auscultation bilaterally [Chest Palpation] : palpation of the chest revealed no abnormalities [Lungs Percussion] : the lungs were normal to percussion [Bowel Sounds] : normal bowel sounds [Abdomen Soft] : soft [Abdomen Tenderness] : non-tender [Abdomen Mass (___ Cm)] : no abdominal mass palpated [Abnormal Walk] : normal gait [Nail Clubbing] : no clubbing of the fingernails [Cyanosis, Localized] : no localized cyanosis [Petechial Hemorrhages (___cm)] : no petechial hemorrhages [Skin Color & Pigmentation] : normal skin color and pigmentation [] : no rash [No Venous Stasis] : no venous stasis [Skin Lesions] : no skin lesions [No Skin Ulcers] : no skin ulcer [No Xanthoma] : no  xanthoma was observed [Deep Tendon Reflexes (DTR)] : deep tendon reflexes were 2+ and symmetric [Sensation] : the sensory exam was normal to light touch and pinprick [No Focal Deficits] : no focal deficits [Oriented To Time, Place, And Person] : oriented to person, place, and time [Impaired Insight] : insight and judgment were intact [Affect] : the affect was normal [FreeTextEntry1] : no thrush

## 2022-07-25 NOTE — REVIEW OF SYSTEMS
[As Noted in HPI] : as noted in HPI [Negative] : Psychiatric [de-identified] : does have Cat Allergy

## 2022-10-03 ENCOUNTER — APPOINTMENT (OUTPATIENT)
Dept: PULMONOLOGY | Facility: CLINIC | Age: 37
End: 2022-10-03

## 2023-04-17 NOTE — PATIENT PROFILE ADULT - NSPROMEDSADMININFO_GEN_A_NUR
no concerns Complex Repair And M Plasty Text: The defect edges were debeveled with a #15 scalpel blade.  The primary defect was closed partially with a complex linear closure.  Given the location of the remaining defect, shape of the defect and the proximity to free margins an M plasty was deemed most appropriate for complete closure of the defect.  Using a sterile surgical marker, an appropriate advancement flap was drawn incorporating the defect and placing the expected incisions within the relaxed skin tension lines where possible.    The area thus outlined was incised deep to adipose tissue with a #15 scalpel blade.  The skin margins were undermined to an appropriate distance in all directions utilizing iris scissors.

## 2023-07-12 ENCOUNTER — RX RENEWAL (OUTPATIENT)
Age: 38
End: 2023-07-12

## 2023-07-12 RX ORDER — ALBUTEROL SULFATE 90 UG/1
108 (90 BASE) POWDER, METERED RESPIRATORY (INHALATION) EVERY 6 HOURS
Qty: 1 | Refills: 1 | Status: ACTIVE | COMMUNITY
Start: 2018-08-16 | End: 1900-01-01

## 2023-08-11 ENCOUNTER — APPOINTMENT (OUTPATIENT)
Dept: PULMONOLOGY | Facility: CLINIC | Age: 38
End: 2023-08-11
Payer: COMMERCIAL

## 2023-08-11 VITALS
DIASTOLIC BLOOD PRESSURE: 80 MMHG | HEART RATE: 95 BPM | RESPIRATION RATE: 16 BRPM | WEIGHT: 150 LBS | SYSTOLIC BLOOD PRESSURE: 113 MMHG | BODY MASS INDEX: 23.49 KG/M2 | OXYGEN SATURATION: 97 %

## 2023-08-11 DIAGNOSIS — J45.30 MILD PERSISTENT ASTHMA, UNCOMPLICATED: ICD-10-CM

## 2023-08-11 LAB — POCT - HEMOGLOBIN (HGB), QUANTITATIVE, TRANSCUTANEOUS: 13.3

## 2023-08-11 PROCEDURE — 94060 EVALUATION OF WHEEZING: CPT

## 2023-08-11 PROCEDURE — 88738 HGB QUANT TRANSCUTANEOUS: CPT

## 2023-08-11 PROCEDURE — 95012 NITRIC OXIDE EXP GAS DETER: CPT

## 2023-08-11 PROCEDURE — 94729 DIFFUSING CAPACITY: CPT

## 2023-08-11 PROCEDURE — 94727 GAS DIL/WSHOT DETER LNG VOL: CPT

## 2023-08-11 PROCEDURE — ZZZZZ: CPT

## 2023-08-11 PROCEDURE — 99214 OFFICE O/P EST MOD 30 MIN: CPT | Mod: 25

## 2023-08-11 RX ORDER — BUDESONIDE AND FORMOTEROL FUMARATE DIHYDRATE 160; 4.5 UG/1; UG/1
160-4.5 AEROSOL RESPIRATORY (INHALATION)
Qty: 1 | Refills: 3 | Status: ACTIVE | COMMUNITY
Start: 2020-01-22 | End: 1900-01-01

## 2023-08-11 NOTE — PHYSICAL EXAM
[General Appearance - Well Developed] : well developed [Normal Appearance] : normal appearance [Well Groomed] : well groomed [General Appearance - Well Nourished] : well nourished [No Deformities] : no deformities [General Appearance - In No Acute Distress] : no acute distress [Normal Conjunctiva] : the conjunctiva exhibited no abnormalities [Eyelids - No Xanthelasma] : the eyelids demonstrated no xanthelasmas [Normal Oropharynx] : normal oropharynx [Neck Appearance] : the appearance of the neck was normal [Neck Cervical Mass (___cm)] : no neck mass was observed [Jugular Venous Distention Increased] : there was no jugular-venous distention [Thyroid Diffuse Enlargement] : the thyroid was not enlarged [Heart Rate And Rhythm] : heart rate and rhythm were normal [Heart Sounds] : normal S1 and S2 [Murmurs] : no murmurs present [Arterial Pulses Normal] : the arterial pulses were normal [Edema] : no peripheral edema present [Veins - Varicosity Changes] : no varicosital changes were noted in the lower extremities [Respiration, Rhythm And Depth] : normal respiratory rhythm and effort [Exaggerated Use Of Accessory Muscles For Inspiration] : no accessory muscle use [Auscultation Breath Sounds / Voice Sounds] : lungs were clear to auscultation bilaterally [Chest Palpation] : palpation of the chest revealed no abnormalities [Lungs Percussion] : the lungs were normal to percussion [Bowel Sounds] : normal bowel sounds [Abdomen Tenderness] : non-tender [Abdomen Soft] : soft [Abdomen Mass (___ Cm)] : no abdominal mass palpated [Abnormal Walk] : normal gait [Nail Clubbing] : no clubbing of the fingernails [Cyanosis, Localized] : no localized cyanosis [Petechial Hemorrhages (___cm)] : no petechial hemorrhages [Skin Color & Pigmentation] : normal skin color and pigmentation [] : no rash [No Venous Stasis] : no venous stasis [No Skin Ulcers] : no skin ulcer [Skin Lesions] : no skin lesions [No Xanthoma] : no  xanthoma was observed [Deep Tendon Reflexes (DTR)] : deep tendon reflexes were 2+ and symmetric [Sensation] : the sensory exam was normal to light touch and pinprick [No Focal Deficits] : no focal deficits [Oriented To Time, Place, And Person] : oriented to person, place, and time [Impaired Insight] : insight and judgment were intact [Affect] : the affect was normal [FreeTextEntry1] : no thrush

## 2023-08-11 NOTE — REVIEW OF SYSTEMS
[As Noted in HPI] : as noted in HPI [Negative] : Psychiatric [de-identified] : does have Cat Allergy

## 2023-08-11 NOTE — PROCEDURE
[FreeTextEntry1] : NIOX  67  ppb  pos  bronchial inflammation  8/11/23 PFT 1411 2023 Moderate obstructive ventilatory impairment Significant +33% response to bronchodilator FEV1 Lung volumes Normal TLC 92% predicted Positive air-trapping with RV/TLC ratio 158 Diffusion normal 94% predicted Hemoglobin 13.3 Data  July 25, 2020 demonstrates a significant decline of the flow rates with a stable diffusion and stable total lung capacity Overall this is consistent with inflammatory airway disease PFT 7/25/22 nl flow  rates pos BD at small  airways Lung volumes nl  no  airtrapping  DLCO 90 % pred HGB 13.4 NIOX 79  ppb bronchial inflammation   Exhaled nitric oxide 19  ppb 1/22/20 and prior 39 ppb Spirometry 1/22/2020  mild OAD  Pos 16 % BD at FEV1

## 2023-08-11 NOTE — DISCUSSION/SUMMARY
[FreeTextEntry1] : chronic persistent asthma pos bronchial inflammation with NIOX elevated PFT data demonstrates a significant decline at the flow rates and reactive response Rx updated Renew controler - symbicort ? adding singulair based on F/U  Notify of any wheezing.  Notify if rescue inhaler is needed greater than 2-3 times in any week.  Avoid known triggers. Advised in detail indication for controller therapy based on elevated NIOX significant bronchodilator response. Discussed pathophysiology and indications

## 2023-09-24 ENCOUNTER — RX RENEWAL (OUTPATIENT)
Age: 38
End: 2023-09-24

## 2023-09-24 RX ORDER — ALBUTEROL SULFATE 90 UG/1
108 (90 BASE) INHALANT RESPIRATORY (INHALATION)
Qty: 1 | Refills: 2 | Status: ACTIVE | COMMUNITY
Start: 2023-07-12 | End: 1900-01-01

## 2023-11-13 ENCOUNTER — APPOINTMENT (OUTPATIENT)
Dept: PULMONOLOGY | Facility: CLINIC | Age: 38
End: 2023-11-13

## 2023-12-11 ENCOUNTER — NON-APPOINTMENT (OUTPATIENT)
Age: 38
End: 2023-12-11

## 2024-05-17 ENCOUNTER — APPOINTMENT (OUTPATIENT)
Dept: PULMONOLOGY | Facility: CLINIC | Age: 39
End: 2024-05-17

## 2024-06-20 RX ORDER — ALBUTEROL SULFATE 90 UG/1
108 (90 BASE) INHALANT RESPIRATORY (INHALATION)
Qty: 1 | Refills: 0 | Status: ACTIVE | COMMUNITY
Start: 2023-07-12 | End: 1900-01-01

## 2024-07-05 ENCOUNTER — APPOINTMENT (OUTPATIENT)
Dept: PULMONOLOGY | Facility: CLINIC | Age: 39
End: 2024-07-05
Payer: COMMERCIAL

## 2024-07-05 VITALS — HEART RATE: 85 BPM | DIASTOLIC BLOOD PRESSURE: 88 MMHG | SYSTOLIC BLOOD PRESSURE: 124 MMHG | OXYGEN SATURATION: 96 %

## 2024-07-05 DIAGNOSIS — J30.9 ALLERGIC RHINITIS, UNSPECIFIED: ICD-10-CM

## 2024-07-05 DIAGNOSIS — J45.30 MILD PERSISTENT ASTHMA, UNCOMPLICATED: ICD-10-CM

## 2024-07-05 LAB — POCT - HEMOGLOBIN (HGB), QUANTITATIVE, TRANSCUTANEOUS: 12.5

## 2024-07-05 PROCEDURE — 99214 OFFICE O/P EST MOD 30 MIN: CPT | Mod: 25

## 2024-07-05 PROCEDURE — 94729 DIFFUSING CAPACITY: CPT

## 2024-07-05 PROCEDURE — ZZZZZ: CPT

## 2024-07-05 PROCEDURE — 95012 NITRIC OXIDE EXP GAS DETER: CPT

## 2024-07-05 PROCEDURE — 94060 EVALUATION OF WHEEZING: CPT

## 2024-07-05 PROCEDURE — 88738 HGB QUANT TRANSCUTANEOUS: CPT

## 2024-07-05 PROCEDURE — 94727 GAS DIL/WSHOT DETER LNG VOL: CPT

## 2024-07-19 RX ORDER — FLUTICASONE PROPIONATE AND SALMETEROL XINAFOATE 115; 21 UG/1; UG/1
115-21 AEROSOL, METERED RESPIRATORY (INHALATION)
Qty: 1 | Refills: 3 | Status: DISCONTINUED | COMMUNITY
Start: 2024-07-05 | End: 2024-07-19

## 2024-07-19 RX ORDER — FLUTICASONE PROPIONATE AND SALMETEROL 250; 50 UG/1; UG/1
250-50 POWDER RESPIRATORY (INHALATION)
Qty: 60 | Refills: 3 | Status: ACTIVE | COMMUNITY
Start: 2024-07-19 | End: 1900-01-01

## 2024-07-24 NOTE — PATIENT PROFILE ADULT - NSTOBACCO TYPE_GEN_A_CORE_RD
You may receive a survey regarding the care you received during your visit.  Your input is valuable to us.  We encourage you to complete and return your survey.  We hope you will choose us in the future for your healthcare needs.   
Cigarettes

## 2024-10-21 ENCOUNTER — RX RENEWAL (OUTPATIENT)
Age: 39
End: 2024-10-21

## 2024-11-12 ENCOUNTER — RESULT REVIEW (OUTPATIENT)
Age: 39
End: 2024-11-12

## 2024-11-12 ENCOUNTER — APPOINTMENT (OUTPATIENT)
Dept: MAMMOGRAPHY | Facility: IMAGING CENTER | Age: 39
End: 2024-11-12
Payer: COMMERCIAL

## 2024-11-12 ENCOUNTER — OUTPATIENT (OUTPATIENT)
Dept: OUTPATIENT SERVICES | Facility: HOSPITAL | Age: 39
LOS: 1 days | End: 2024-11-12
Payer: COMMERCIAL

## 2024-11-12 DIAGNOSIS — Z98.891 HISTORY OF UTERINE SCAR FROM PREVIOUS SURGERY: Chronic | ICD-10-CM

## 2024-11-12 DIAGNOSIS — Z00.8 ENCOUNTER FOR OTHER GENERAL EXAMINATION: ICD-10-CM

## 2024-11-12 PROCEDURE — 77067 SCR MAMMO BI INCL CAD: CPT | Mod: 26

## 2024-11-12 PROCEDURE — 77063 BREAST TOMOSYNTHESIS BI: CPT

## 2024-11-12 PROCEDURE — 77067 SCR MAMMO BI INCL CAD: CPT

## 2024-11-12 PROCEDURE — 77063 BREAST TOMOSYNTHESIS BI: CPT | Mod: 26

## 2024-11-14 ENCOUNTER — APPOINTMENT (OUTPATIENT)
Dept: PULMONOLOGY | Facility: CLINIC | Age: 39
End: 2024-11-14

## 2025-01-10 ENCOUNTER — APPOINTMENT (OUTPATIENT)
Dept: PULMONOLOGY | Facility: CLINIC | Age: 40
End: 2025-01-10
Payer: COMMERCIAL

## 2025-01-10 VITALS
OXYGEN SATURATION: 98 % | DIASTOLIC BLOOD PRESSURE: 87 MMHG | RESPIRATION RATE: 16 BRPM | WEIGHT: 155 LBS | HEIGHT: 67 IN | BODY MASS INDEX: 24.33 KG/M2 | SYSTOLIC BLOOD PRESSURE: 134 MMHG | HEART RATE: 100 BPM

## 2025-01-10 DIAGNOSIS — J45.30 MILD PERSISTENT ASTHMA, UNCOMPLICATED: ICD-10-CM

## 2025-01-10 DIAGNOSIS — Z23 ENCOUNTER FOR IMMUNIZATION: ICD-10-CM

## 2025-01-10 PROCEDURE — 94060 EVALUATION OF WHEEZING: CPT

## 2025-01-10 PROCEDURE — G0008: CPT

## 2025-01-10 PROCEDURE — 99214 OFFICE O/P EST MOD 30 MIN: CPT | Mod: 25

## 2025-01-10 PROCEDURE — 90656 IIV3 VACC NO PRSV 0.5 ML IM: CPT

## 2025-01-10 PROCEDURE — 95012 NITRIC OXIDE EXP GAS DETER: CPT

## 2025-02-23 ENCOUNTER — NON-APPOINTMENT (OUTPATIENT)
Age: 40
End: 2025-02-23

## 2025-04-11 ENCOUNTER — APPOINTMENT (OUTPATIENT)
Dept: PULMONOLOGY | Facility: CLINIC | Age: 40
End: 2025-04-11

## 2025-05-14 ENCOUNTER — RESULT REVIEW (OUTPATIENT)
Age: 40
End: 2025-05-14

## 2025-07-03 ENCOUNTER — APPOINTMENT (OUTPATIENT)
Dept: PULMONOLOGY | Facility: CLINIC | Age: 40
End: 2025-07-03
Payer: COMMERCIAL

## 2025-07-03 VITALS — DIASTOLIC BLOOD PRESSURE: 71 MMHG | HEART RATE: 93 BPM | OXYGEN SATURATION: 95 % | SYSTOLIC BLOOD PRESSURE: 106 MMHG

## 2025-07-03 PROCEDURE — 99214 OFFICE O/P EST MOD 30 MIN: CPT | Mod: 25

## 2025-07-03 PROCEDURE — 95012 NITRIC OXIDE EXP GAS DETER: CPT

## 2025-07-03 PROCEDURE — 94060 EVALUATION OF WHEEZING: CPT
